# Patient Record
Sex: FEMALE | Race: WHITE | Employment: FULL TIME | URBAN - METROPOLITAN AREA
[De-identification: names, ages, dates, MRNs, and addresses within clinical notes are randomized per-mention and may not be internally consistent; named-entity substitution may affect disease eponyms.]

---

## 2019-11-04 ENCOUNTER — OFFICE VISIT (OUTPATIENT)
Dept: FAMILY MEDICINE CLINIC | Age: 24
End: 2019-11-04
Payer: COMMERCIAL

## 2019-11-04 VITALS
BODY MASS INDEX: 43.16 KG/M2 | SYSTOLIC BLOOD PRESSURE: 110 MMHG | OXYGEN SATURATION: 98 % | DIASTOLIC BLOOD PRESSURE: 64 MMHG | HEART RATE: 92 BPM | WEIGHT: 275 LBS | HEIGHT: 67 IN | TEMPERATURE: 98 F

## 2019-11-04 DIAGNOSIS — G47.30 SLEEP APNEA, UNSPECIFIED TYPE: Primary | ICD-10-CM

## 2019-11-04 DIAGNOSIS — G43.109 MIGRAINE WITH AURA AND WITHOUT STATUS MIGRAINOSUS, NOT INTRACTABLE: ICD-10-CM

## 2019-11-04 PROCEDURE — 99203 OFFICE O/P NEW LOW 30 MIN: CPT | Performed by: NURSE PRACTITIONER

## 2019-11-04 RX ORDER — SUMATRIPTAN 50 MG/1
50 TABLET, FILM COATED ORAL
Qty: 9 TABLET | Refills: 3 | Status: SHIPPED | OUTPATIENT
Start: 2019-11-04 | End: 2020-10-06

## 2019-11-04 ASSESSMENT — ENCOUNTER SYMPTOMS
SHORTNESS OF BREATH: 0
WHEEZING: 0

## 2019-11-04 ASSESSMENT — PATIENT HEALTH QUESTIONNAIRE - PHQ9
SUM OF ALL RESPONSES TO PHQ QUESTIONS 1-9: 0
SUM OF ALL RESPONSES TO PHQ9 QUESTIONS 1 & 2: 0
1. LITTLE INTEREST OR PLEASURE IN DOING THINGS: 0
2. FEELING DOWN, DEPRESSED OR HOPELESS: 0
SUM OF ALL RESPONSES TO PHQ QUESTIONS 1-9: 0

## 2019-12-12 ENCOUNTER — OFFICE VISIT (OUTPATIENT)
Dept: PULMONOLOGY | Age: 24
End: 2019-12-12
Payer: COMMERCIAL

## 2019-12-12 ENCOUNTER — TELEPHONE (OUTPATIENT)
Dept: PULMONOLOGY | Age: 24
End: 2019-12-12

## 2019-12-12 VITALS
HEART RATE: 85 BPM | HEIGHT: 67 IN | RESPIRATION RATE: 16 BRPM | BODY MASS INDEX: 42.53 KG/M2 | TEMPERATURE: 99 F | SYSTOLIC BLOOD PRESSURE: 118 MMHG | DIASTOLIC BLOOD PRESSURE: 85 MMHG | WEIGHT: 271 LBS | OXYGEN SATURATION: 98 %

## 2019-12-12 DIAGNOSIS — E66.01 OBESITY, MORBID, BMI 40.0-49.9 (HCC): ICD-10-CM

## 2019-12-12 DIAGNOSIS — G47.10 HYPERSOMNIA: ICD-10-CM

## 2019-12-12 DIAGNOSIS — G47.30 OBSERVED SLEEP APNEA: ICD-10-CM

## 2019-12-12 DIAGNOSIS — G47.33 MILD OBSTRUCTIVE SLEEP APNEA: Primary | ICD-10-CM

## 2019-12-12 DIAGNOSIS — Z71.89 CPAP USE COUNSELING: ICD-10-CM

## 2019-12-12 DIAGNOSIS — R06.83 SNORING: ICD-10-CM

## 2019-12-12 PROCEDURE — 99203 OFFICE O/P NEW LOW 30 MIN: CPT | Performed by: NURSE PRACTITIONER

## 2019-12-12 ASSESSMENT — SLEEP AND FATIGUE QUESTIONNAIRES
HOW LIKELY ARE YOU TO NOD OFF OR FALL ASLEEP WHILE SITTING AND READING: 2
HOW LIKELY ARE YOU TO NOD OFF OR FALL ASLEEP WHILE SITTING INACTIVE IN A PUBLIC PLACE: 0
HOW LIKELY ARE YOU TO NOD OFF OR FALL ASLEEP WHILE LYING DOWN TO REST IN THE AFTERNOON WHEN CIRCUMSTANCES PERMIT: 3
NECK CIRCUMFERENCE (INCHES): 17.25
HOW LIKELY ARE YOU TO NOD OFF OR FALL ASLEEP IN A CAR, WHILE STOPPED FOR A FEW MINUTES IN TRAFFIC: 0
HOW LIKELY ARE YOU TO NOD OFF OR FALL ASLEEP WHILE SITTING AND TALKING TO SOMEONE: 0
HOW LIKELY ARE YOU TO NOD OFF OR FALL ASLEEP WHILE SITTING QUIETLY AFTER LUNCH WITHOUT ALCOHOL: 1
ESS TOTAL SCORE: 11
HOW LIKELY ARE YOU TO NOD OFF OR FALL ASLEEP WHEN YOU ARE A PASSENGER IN A CAR FOR AN HOUR WITHOUT A BREAK: 3
HOW LIKELY ARE YOU TO NOD OFF OR FALL ASLEEP WHILE WATCHING TV: 2

## 2019-12-16 ENCOUNTER — OFFICE VISIT (OUTPATIENT)
Dept: FAMILY MEDICINE CLINIC | Age: 24
End: 2019-12-16
Payer: COMMERCIAL

## 2019-12-16 ENCOUNTER — HOSPITAL ENCOUNTER (OUTPATIENT)
Dept: GENERAL RADIOLOGY | Age: 24
Discharge: HOME | End: 2019-12-16
Payer: COMMERCIAL

## 2019-12-16 VITALS
DIASTOLIC BLOOD PRESSURE: 74 MMHG | TEMPERATURE: 98.3 F | OXYGEN SATURATION: 97 % | WEIGHT: 273 LBS | SYSTOLIC BLOOD PRESSURE: 100 MMHG | BODY MASS INDEX: 42.85 KG/M2 | HEIGHT: 67 IN | HEART RATE: 87 BPM

## 2019-12-16 DIAGNOSIS — M25.571 ACUTE RIGHT ANKLE PAIN: ICD-10-CM

## 2019-12-16 DIAGNOSIS — R53.83 FATIGUE, UNSPECIFIED TYPE: ICD-10-CM

## 2019-12-16 DIAGNOSIS — Z00.00 PHYSICAL EXAM: Primary | ICD-10-CM

## 2019-12-16 DIAGNOSIS — M79.671 RIGHT FOOT PAIN: ICD-10-CM

## 2019-12-16 DIAGNOSIS — M79.671 RIGHT FOOT PAIN: Primary | ICD-10-CM

## 2019-12-16 LAB
A/G RATIO: 1.3 (ref 1.1–2.2)
ALBUMIN SERPL-MCNC: 3.9 G/DL (ref 3.4–5)
ALP BLD-CCNC: 108 U/L (ref 40–129)
ALT SERPL-CCNC: 14 U/L (ref 10–40)
ANION GAP SERPL CALCULATED.3IONS-SCNC: 14 MMOL/L (ref 3–16)
AST SERPL-CCNC: 11 U/L (ref 15–37)
BASOPHILS ABSOLUTE: 0 K/UL (ref 0–0.2)
BASOPHILS RELATIVE PERCENT: 0.4 %
BILIRUB SERPL-MCNC: 0.3 MG/DL (ref 0–1)
BUN BLDV-MCNC: 21 MG/DL (ref 7–20)
CALCIUM SERPL-MCNC: 9.6 MG/DL (ref 8.3–10.6)
CHLORIDE BLD-SCNC: 103 MMOL/L (ref 99–110)
CHOLESTEROL, TOTAL: 161 MG/DL (ref 0–199)
CO2: 23 MMOL/L (ref 21–32)
CREAT SERPL-MCNC: 0.6 MG/DL (ref 0.6–1.1)
EOSINOPHILS ABSOLUTE: 0.2 K/UL (ref 0–0.6)
EOSINOPHILS RELATIVE PERCENT: 1.7 %
GFR AFRICAN AMERICAN: >60
GFR NON-AFRICAN AMERICAN: >60
GLOBULIN: 3.1 G/DL
GLUCOSE BLD-MCNC: 101 MG/DL (ref 70–99)
HCT VFR BLD CALC: 43.7 % (ref 36–48)
HDLC SERPL-MCNC: 34 MG/DL (ref 40–60)
HEMOGLOBIN: 13.9 G/DL (ref 12–16)
LDL CHOLESTEROL CALCULATED: 99 MG/DL
LYMPHOCYTES ABSOLUTE: 3 K/UL (ref 1–5.1)
LYMPHOCYTES RELATIVE PERCENT: 23.4 %
MCH RBC QN AUTO: 27.8 PG (ref 26–34)
MCHC RBC AUTO-ENTMCNC: 31.8 G/DL (ref 31–36)
MCV RBC AUTO: 87.5 FL (ref 80–100)
MONOCYTES ABSOLUTE: 0.5 K/UL (ref 0–1.3)
MONOCYTES RELATIVE PERCENT: 3.8 %
NEUTROPHILS ABSOLUTE: 9 K/UL (ref 1.7–7.7)
NEUTROPHILS RELATIVE PERCENT: 70.7 %
PDW BLD-RTO: 15.8 % (ref 12.4–15.4)
PLATELET # BLD: 356 K/UL (ref 135–450)
PMV BLD AUTO: 8.9 FL (ref 5–10.5)
POTASSIUM SERPL-SCNC: 4.8 MMOL/L (ref 3.5–5.1)
RBC # BLD: 5 M/UL (ref 4–5.2)
SODIUM BLD-SCNC: 140 MMOL/L (ref 136–145)
TOTAL PROTEIN: 7 G/DL (ref 6.4–8.2)
TRIGL SERPL-MCNC: 140 MG/DL (ref 0–150)
TSH REFLEX FT4: 3.25 UIU/ML (ref 0.27–4.2)
VLDLC SERPL CALC-MCNC: 28 MG/DL
WBC # BLD: 12.7 K/UL (ref 4–11)

## 2019-12-16 PROCEDURE — 99395 PREV VISIT EST AGE 18-39: CPT | Performed by: NURSE PRACTITIONER

## 2019-12-16 PROCEDURE — 36415 COLL VENOUS BLD VENIPUNCTURE: CPT | Performed by: NURSE PRACTITIONER

## 2019-12-16 PROCEDURE — 73610 X-RAY EXAM OF ANKLE: CPT

## 2019-12-16 PROCEDURE — 73630 X-RAY EXAM OF FOOT: CPT

## 2019-12-16 ASSESSMENT — ENCOUNTER SYMPTOMS
DIARRHEA: 0
ABDOMINAL PAIN: 0
WHEEZING: 0
SHORTNESS OF BREATH: 0
SORE THROAT: 0
COUGH: 0
NAUSEA: 0

## 2019-12-17 LAB
ESTIMATED AVERAGE GLUCOSE: 125.5 MG/DL
HBA1C MFR BLD: 6 %

## 2019-12-17 NOTE — TELEPHONE ENCOUNTER
HST from 11/2/2017 reviewed and showed RDI 16.7, low SPO2 85%. Okay to trial auto CPAP.   Orders reviewed and signed

## 2020-03-17 ENCOUNTER — NURSE TRIAGE (OUTPATIENT)
Dept: OTHER | Facility: CLINIC | Age: 25
End: 2020-03-17

## 2020-03-17 NOTE — TELEPHONE ENCOUNTER
Reason for Disposition   Wheezing is present    Protocols used: COUGH-ADULT-OH    Patient called [facility]) to schedule appointment, with red flag complaint, transferred to RN access for triage. Reports having cough, runny nose, shortness of breath with exertion. States symptoms began two Fridays ago. Reports cough is a dry cough. Denies any fever. Patient informed of disposition. Care advice as documented. Instructed patient to call back with worsening symptoms. Soft transfer to pre-service center to schedule appointment as recommended. Please do not respond to the triage nurse through this encounter. Any subsequent communication should be directly with the patient.

## 2020-03-17 NOTE — PATIENT INSTRUCTIONS
Steps to help prevent the spread of COVID-19 if you are sick  SOURCE - [email or web address]-mcginnis.info/. html     Stay home except to get medical care   ; Stay home: People who are mildly ill with COVID-19 are able to isolate at home during their illness. You should restrict activities outside your home, except for getting medical care.   ; Avoid public areas: Do not go to work, school, or public areas.   ; Avoid public transportation: Avoid using public transportation, ride-sharing, or taxis.  ; Separate yourself from other people and animals in your home   ; Stay away from others: As much as possible, you should stay in a specific room and away from other people in your home. Also, you should use a separate bathroom, if available.   ; Limit contact with pets & animals: You should restrict contact with pets and other animals while you are sick with COVID-19, just like you would around other people. Although there have not been reports of pets or other animals becoming sick with COVID-19, it is still recommended that people sick with COVID-19 limit contact with animals until more information is known about the virus. ; When possible, have another member of your household care for your animals while you are sick. If you are sick with COVID-19, avoid contact with your pet, including petting, snuggling, being kissed or licked, and sharing food. If you must care for your pet or be around animals while you are sick, wash your hands before and after you interact with pets and wear a facemask. See COVID-19 and Animals for more information. Other considerations   The ill person should eat/be fed in their room if possible. Non-disposable  items used should be handled with gloves and washed with hot water or in a . Clean hands after handling used  items.  If possible, dedicate a lined trash can for the ill person.  Use gloves when removing garbage bags, handling, and disposing of trash. Wash hands after handling or disposing of trash.  Consider consulting with your local health department about trash disposal guidance if available. Information for Household Members and Caregivers of Someone who is Sick   Call ahead before visiting your doctor   Call ahead: If you have a medical appointment, call the healthcare provider and tell them that you have or may have COVID-19. This will help the healthcare provider's office take steps to keep other people from getting infected or exposed. Wear a facemask if you are sick   ; If you are sick: You should wear a facemask when you are around other people (e.g., sharing a room or vehicle) or pets and before you enter a healthcare provider's office. ; If you are caring for others: If the person who is sick is not able to wear a facemask (for example, because it causes trouble breathing), then people who live with the person who is sick should not stay in the same room with them, or they should wear a facemask if they enter a room with the person who is sick. Cover your coughs and sneezes   ; Cover: Cover your mouth and nose with a tissue when you cough or sneeze.   ; Dispose: Throw used tissues in a lined trash can.   ; Wash hands: Immediately wash your hands with soap and water for at least 20 seconds or, if soap and water are not available, clean your hands with an alcohol-based hand  that contains at least 60% alcohol. Clean your hands often   ;  Wash hands: Wash your hands often with soap and water for at least 20 seconds, especially after blowing your nose, coughing, or sneezing; going to the bathroom; and before eating or preparing food.   ; Hand : If soap and water are not readily available, use an alcohol-based hand  with at least 60% alcohol, covering all surfaces of your hands and rubbing them together until they feel dry.   ; Soap and water: Soap and water are the best option if

## 2020-03-18 ENCOUNTER — OFFICE VISIT (OUTPATIENT)
Dept: PRIMARY CARE CLINIC | Age: 25
End: 2020-03-18
Payer: COMMERCIAL

## 2020-03-18 VITALS — OXYGEN SATURATION: 98 % | TEMPERATURE: 98.4 F | HEART RATE: 85 BPM

## 2020-03-18 PROCEDURE — 99212 OFFICE O/P EST SF 10 MIN: CPT | Performed by: NURSE PRACTITIONER

## 2020-03-25 LAB
REPORT: NORMAL
SARS-COV-2: NOT DETECTED
THIS TEST SENT TO: NORMAL

## 2020-04-27 ENCOUNTER — E-VISIT (OUTPATIENT)
Dept: PRIMARY CARE CLINIC | Age: 25
End: 2020-04-27
Payer: COMMERCIAL

## 2020-04-27 PROCEDURE — 99422 OL DIG E/M SVC 11-20 MIN: CPT | Performed by: FAMILY MEDICINE

## 2020-04-27 RX ORDER — OFLOXACIN 3 MG/ML
1-2 SOLUTION/ DROPS OPHTHALMIC 4 TIMES DAILY
Qty: 5 ML | Refills: 0 | Status: SHIPPED | OUTPATIENT
Start: 2020-04-27 | End: 2020-05-02

## 2020-09-08 ENCOUNTER — E-VISIT (OUTPATIENT)
Dept: FAMILY MEDICINE CLINIC | Age: 25
End: 2020-09-08
Payer: COMMERCIAL

## 2020-09-08 PROCEDURE — 98970 NQHP OL DIG ASSMT&MGMT 5-10: CPT | Performed by: NURSE PRACTITIONER

## 2020-09-08 RX ORDER — SELENIUM SULFIDE 22.5 MG/ML
SHAMPOO TOPICAL
Qty: 180 ML | Refills: 2 | Status: SHIPPED | OUTPATIENT
Start: 2020-09-08 | End: 2020-10-06

## 2020-09-08 NOTE — PROGRESS NOTES
Looks like it could be tinea versicolor which is a fungal rash- sent in an antifungal shampoo to use.   ~8 min spent on visit

## 2020-10-06 ENCOUNTER — OFFICE VISIT (OUTPATIENT)
Dept: FAMILY MEDICINE CLINIC | Age: 25
End: 2020-10-06
Payer: COMMERCIAL

## 2020-10-06 VITALS
HEART RATE: 89 BPM | WEIGHT: 274.4 LBS | DIASTOLIC BLOOD PRESSURE: 80 MMHG | SYSTOLIC BLOOD PRESSURE: 114 MMHG | OXYGEN SATURATION: 97 % | HEIGHT: 68 IN | BODY MASS INDEX: 41.59 KG/M2 | TEMPERATURE: 97.3 F

## 2020-10-06 LAB
A/G RATIO: 1.6 (ref 1.1–2.2)
ALBUMIN SERPL-MCNC: 4.4 G/DL (ref 3.4–5)
ALP BLD-CCNC: 98 U/L (ref 40–129)
ALT SERPL-CCNC: 14 U/L (ref 10–40)
ANION GAP SERPL CALCULATED.3IONS-SCNC: 15 MMOL/L (ref 3–16)
AST SERPL-CCNC: 15 U/L (ref 15–37)
BASOPHILS ABSOLUTE: 0.1 K/UL (ref 0–0.2)
BASOPHILS RELATIVE PERCENT: 0.6 %
BILIRUB SERPL-MCNC: 0.5 MG/DL (ref 0–1)
BUN BLDV-MCNC: 15 MG/DL (ref 7–20)
CALCIUM SERPL-MCNC: 9.6 MG/DL (ref 8.3–10.6)
CHLORIDE BLD-SCNC: 99 MMOL/L (ref 99–110)
CHOLESTEROL, TOTAL: 173 MG/DL (ref 0–199)
CO2: 20 MMOL/L (ref 21–32)
CREAT SERPL-MCNC: 0.7 MG/DL (ref 0.6–1.1)
EOSINOPHILS ABSOLUTE: 0.3 K/UL (ref 0–0.6)
EOSINOPHILS RELATIVE PERCENT: 2.9 %
GFR AFRICAN AMERICAN: >60
GFR NON-AFRICAN AMERICAN: >60
GLOBULIN: 2.7 G/DL
GLUCOSE BLD-MCNC: 96 MG/DL (ref 70–99)
HCT VFR BLD CALC: 44.8 % (ref 36–48)
HDLC SERPL-MCNC: 30 MG/DL (ref 40–60)
HEMOGLOBIN: 14.5 G/DL (ref 12–16)
LDL CHOLESTEROL CALCULATED: 97 MG/DL
LYMPHOCYTES ABSOLUTE: 2.5 K/UL (ref 1–5.1)
LYMPHOCYTES RELATIVE PERCENT: 23.5 %
MCH RBC QN AUTO: 28 PG (ref 26–34)
MCHC RBC AUTO-ENTMCNC: 32.5 G/DL (ref 31–36)
MCV RBC AUTO: 86.3 FL (ref 80–100)
MONOCYTES ABSOLUTE: 0.4 K/UL (ref 0–1.3)
MONOCYTES RELATIVE PERCENT: 3.9 %
NEUTROPHILS ABSOLUTE: 7.2 K/UL (ref 1.7–7.7)
NEUTROPHILS RELATIVE PERCENT: 69.1 %
PDW BLD-RTO: 15.3 % (ref 12.4–15.4)
PLATELET # BLD: 354 K/UL (ref 135–450)
PMV BLD AUTO: 8.6 FL (ref 5–10.5)
POTASSIUM SERPL-SCNC: 4.4 MMOL/L (ref 3.5–5.1)
RBC # BLD: 5.19 M/UL (ref 4–5.2)
SODIUM BLD-SCNC: 134 MMOL/L (ref 136–145)
TOTAL PROTEIN: 7.1 G/DL (ref 6.4–8.2)
TRIGL SERPL-MCNC: 230 MG/DL (ref 0–150)
VLDLC SERPL CALC-MCNC: 46 MG/DL
WBC # BLD: 10.4 K/UL (ref 4–11)

## 2020-10-06 PROCEDURE — 90471 IMMUNIZATION ADMIN: CPT | Performed by: NURSE PRACTITIONER

## 2020-10-06 PROCEDURE — 90686 IIV4 VACC NO PRSV 0.5 ML IM: CPT | Performed by: NURSE PRACTITIONER

## 2020-10-06 PROCEDURE — 36415 COLL VENOUS BLD VENIPUNCTURE: CPT | Performed by: NURSE PRACTITIONER

## 2020-10-06 PROCEDURE — G8482 FLU IMMUNIZE ORDER/ADMIN: HCPCS | Performed by: NURSE PRACTITIONER

## 2020-10-06 PROCEDURE — 99395 PREV VISIT EST AGE 18-39: CPT | Performed by: NURSE PRACTITIONER

## 2020-10-06 ASSESSMENT — ENCOUNTER SYMPTOMS
DIARRHEA: 0
SHORTNESS OF BREATH: 0
COUGH: 0
ABDOMINAL PAIN: 0
SORE THROAT: 0
EYE PAIN: 0
WHEEZING: 0
CONSTIPATION: 0

## 2020-10-06 ASSESSMENT — PATIENT HEALTH QUESTIONNAIRE - PHQ9
SUM OF ALL RESPONSES TO PHQ9 QUESTIONS 1 & 2: 0
SUM OF ALL RESPONSES TO PHQ QUESTIONS 1-9: 0
1. LITTLE INTEREST OR PLEASURE IN DOING THINGS: 0
SUM OF ALL RESPONSES TO PHQ QUESTIONS 1-9: 0
2. FEELING DOWN, DEPRESSED OR HOPELESS: 0

## 2020-10-06 NOTE — PROGRESS NOTES
Patient ID: Fozia Martínez is a 30 y.o. female who presents today for a Physical Exam.      HPI   Here for physical exam    Chronic bilateral knee pain: has been hurting for a long time- states she saw someone at [facility] clinic in the past and was told it is runners knee. She states it hurts when she walks or goes up and down steps. Has done PT in the past and it has not helped.  She takes ibuprofen prn- doesn't help much    Needs a GYN- has nexplanon and wants it removed- will give GYN name and info    Past Medical History:   Diagnosis Date    Mild pre-eclampsia, postpartum 2018    had to go back to the hospital- with second pregancy     Sleep apnea     Varicella        Past Surgical History:   Procedure Laterality Date    BLADDER SURGERY      when she was around 3 yo       Family History   Problem Relation Age of Onset    High Blood Pressure Mother     High Cholesterol Mother     Asthma Mother     High Cholesterol Father     High Blood Pressure Maternal Grandmother     Other Maternal Grandmother         prediabetes    No Known Problems Paternal Grandmother     No Known Problems Paternal Grandfather           Social History     Socioeconomic History    Marital status: Single     Spouse name: None    Number of children: None    Years of education: None    Highest education level: None   Occupational History    None   Social Needs    Financial resource strain: None    Food insecurity     Worry: None     Inability: None    Transportation needs     Medical: None     Non-medical: None   Tobacco Use    Smoking status: Never Smoker    Smokeless tobacco: Never Used   Substance and Sexual Activity    Alcohol use: Yes     Comment: social    Drug use: Never    Sexual activity: Yes     Partners: Male     Birth control/protection: Implant     Comment: nexplanon   Lifestyle    Physical activity     Days per week: None     Minutes per session: None    Stress: None   Relationships    Social connections Talks on phone: None     Gets together: None     Attends Alevism service: None     Active member of club or organization: None     Attends meetings of clubs or organizations: None     Relationship status: None    Intimate partner violence     Fear of current or ex partner: None     Emotionally abused: None     Physically abused: None     Forced sexual activity: None   Other Topics Concern    None   Social History Narrative    None       Allergies   Allergen Reactions    Sulfa Antibiotics Other (See Comments)     Unsure of reaction    Pcn [Penicillins] Rash       Current Outpatient Medications   Medication Sig Dispense Refill    Etonogestrel (NEXPLANON SC) Inject 1 Device into the skin Indications: placed 2018       No current facility-administered medications for this visit. The patient's past medical history, past surgical history, family history, medications, and allergies were all reviewed and updated at appropriate today. Review of Systems   Constitutional: Negative for fever. HENT: Negative for congestion and sore throat. Eyes: Negative for pain and visual disturbance. Wears glasses and contacts   Respiratory: Negative for cough, shortness of breath and wheezing. Cardiovascular: Negative for chest pain and palpitations. Gastrointestinal: Negative for abdominal pain, constipation and diarrhea. Endocrine: Negative for cold intolerance and heat intolerance. Genitourinary: Negative for difficulty urinating, frequency and urgency. Musculoskeletal:        Bilateral Chronic knee pain - [facility] called it \"runners knee\"- ibuprofen as needed   Skin: Negative for rash and wound. Allergic/Immunologic: Negative for food allergies. Environmental allergies: seasonal.   Neurological: Negative for numbness and headaches. Hematological: Does not bruise/bleed easily. Psychiatric/Behavioral: Negative for dysphoric mood and sleep disturbance. The patient is not nervous/anxious. Physical Exam  Vitals signs and nursing note reviewed. Constitutional:       Appearance: Normal appearance. She is well-developed. She is obese. HENT:      Head: Normocephalic and atraumatic. Right Ear: Tympanic membrane and external ear normal.      Left Ear: Tympanic membrane and external ear normal.      Nose: Nose normal.      Mouth/Throat:      Pharynx: No oropharyngeal exudate or posterior oropharyngeal erythema. Eyes:      Conjunctiva/sclera: Conjunctivae normal.   Neck:      Musculoskeletal: Normal range of motion and neck supple. Cardiovascular:      Rate and Rhythm: Normal rate and regular rhythm. Heart sounds: Normal heart sounds. No murmur. Pulmonary:      Effort: Pulmonary effort is normal. No respiratory distress. Breath sounds: Normal breath sounds. No wheezing or rales. Abdominal:      General: Bowel sounds are normal. There is no distension. Palpations: Abdomen is soft. Tenderness: There is no abdominal tenderness. There is no rebound. Musculoskeletal: Normal range of motion. General: No swelling. Lymphadenopathy:      Cervical: No cervical adenopathy. Skin:     General: Skin is warm and dry. Neurological:      General: No focal deficit present. Mental Status: She is alert and oriented to person, place, and time. Deep Tendon Reflexes: Reflexes are normal and symmetric. Psychiatric:         Mood and Affect: Mood normal.         Behavior: Behavior normal.         Thought Content: Thought content normal.         Judgment: Judgment normal.         Assessment:  Encounter Diagnoses   Name Primary?  Physical exam Yes    Chronic pain of both knees     Need for influenza vaccination        Plan:  1. Physical exam    - CBC Auto Differential  - Comprehensive Metabolic Panel  - Hemoglobin A1C  - Lipid Panel    2. Chronic pain of both knees    - Rosalba - Silver Hoskins DO, Orthopedic Surgery, [facility]    3.  Need for influenza

## 2020-10-06 NOTE — PATIENT INSTRUCTIONS
[facility] - Anh Clancy, DO  [address] [address]   CDNVN:[phone]

## 2020-10-07 LAB
ESTIMATED AVERAGE GLUCOSE: 128.4 MG/DL
HBA1C MFR BLD: 6.1 %

## 2020-10-22 ENCOUNTER — OFFICE VISIT (OUTPATIENT)
Dept: ORTHOPEDIC SURGERY | Age: 25
End: 2020-10-22
Payer: COMMERCIAL

## 2020-10-22 VITALS — HEIGHT: 68 IN | BODY MASS INDEX: 41.52 KG/M2 | WEIGHT: 274 LBS

## 2020-10-22 PROCEDURE — 99203 OFFICE O/P NEW LOW 30 MIN: CPT | Performed by: ORTHOPAEDIC SURGERY

## 2020-10-22 PROCEDURE — G8417 CALC BMI ABV UP PARAM F/U: HCPCS | Performed by: ORTHOPAEDIC SURGERY

## 2020-10-22 PROCEDURE — G8427 DOCREV CUR MEDS BY ELIG CLIN: HCPCS | Performed by: ORTHOPAEDIC SURGERY

## 2020-10-22 PROCEDURE — G8482 FLU IMMUNIZE ORDER/ADMIN: HCPCS | Performed by: ORTHOPAEDIC SURGERY

## 2020-10-22 RX ORDER — DICLOFENAC SODIUM 75 MG/1
75 TABLET, DELAYED RELEASE ORAL 2 TIMES DAILY
Qty: 60 TABLET | Refills: 2 | Status: SHIPPED | OUTPATIENT
Start: 2020-10-22 | End: 2021-10-25

## 2020-10-22 NOTE — PROGRESS NOTES
Male     Birth control/protection: Implant     Comment: nexplanon   Lifestyle    Physical activity     Days per week: Not on file     Minutes per session: Not on file    Stress: Not on file   Relationships    Social connections     Talks on phone: Not on file     Gets together: Not on file     Attends Sabianism service: Not on file     Active member of club or organization: Not on file     Attends meetings of clubs or organizations: Not on file     Relationship status: Not on file    Intimate partner violence     Fear of current or ex partner: Not on file     Emotionally abused: Not on file     Physically abused: Not on file     Forced sexual activity: Not on file   Other Topics Concern    Not on file   Social History Narrative    Not on file     Allergies   Allergen Reactions    Sulfa Antibiotics Other (See Comments)     Unsure of reaction    Pcn [Penicillins] Rash       Review of Systems:  Constitutional: negative  Respiratory: negative  Cardiovascular: negative  Musculoskeletal:negative except for New Patient (Bilateral knee arpita for a few years)    Relevant review of systems reviewed and available in the patient's chart in media tab    Vital Signs:  Vitals:    10/22/20 0843   Weight: 274 lb (124.3 kg)   Height: 5' 7.72\" (1.72 m)         General Exam:   Constitutional: Patient is adequately groomed with no evidence of malnutrition  Mental Status: The patient is oriented to time, place and person. The patient's mood and affect are appropriate. Vascular: Examination reveals no swelling or calf tenderness. Peripheral pulses are palpable and 2+.    bilateral Knee Examination  Inspection:   No gross deformities noted. no swelling noted. No erythema or ecchymosis. Skin is intact.     Palpation: negative Tenderness to palpation along the medial joint line, negative Tenderness to palpation along lateral joint line, positive Tenderness to palpation along medial and lateral facets of undersurface of the treatment, including physical therapy, and oral medications. Today we prescribed diclofenac and HEP. will continue weight loss with diet and exercise. They understand that PFS can require long-term physical therapy to alleviate symptoms. She will follow up in 2-3 months for reevaluation. Patient agrees with this plan, all of their questions were answered best of our ability and to their satisfaction.       Roxana Andrade

## 2020-11-04 ENCOUNTER — OFFICE VISIT (OUTPATIENT)
Dept: OBGYN CLINIC | Age: 25
End: 2020-11-04
Payer: COMMERCIAL

## 2020-11-04 VITALS
HEART RATE: 88 BPM | BODY MASS INDEX: 42.9 KG/M2 | SYSTOLIC BLOOD PRESSURE: 128 MMHG | WEIGHT: 279.8 LBS | DIASTOLIC BLOOD PRESSURE: 84 MMHG | TEMPERATURE: 97.6 F

## 2020-11-04 PROCEDURE — 99385 PREV VISIT NEW AGE 18-39: CPT | Performed by: OBSTETRICS & GYNECOLOGY

## 2020-11-04 PROCEDURE — G8482 FLU IMMUNIZE ORDER/ADMIN: HCPCS | Performed by: OBSTETRICS & GYNECOLOGY

## 2020-11-04 NOTE — PROGRESS NOTES
Annual Exam      CC:   Chief Complaint   Patient presents with    Gynecologic Exam     annual       HPI:  30 y.o. [id number] presents for her gynecologic annual exam.     Also wants to discuss nexplanon removal, has been feeling really emotional and has had worsening acne. Has been on nuvaring, Mirena, OCP and all have had really bad side effects. States she would like to just try to be off of everything as her partner will be getting a vasectomy soon. Patient seen and examined. Review of Systems:   Review of Systems   Constitutional: Negative for chills and fever. Respiratory: Negative for shortness of breath. Cardiovascular: Negative for chest pain. Gastrointestinal: Negative for abdominal pain, constipation, diarrhea, nausea and vomiting. Genitourinary: Negative for difficulty urinating, dysuria and menstrual problem. Neurological: Negative for dizziness, light-headedness and headaches. Psychiatric/Behavioral: Positive for dysphoric mood.        Primary Care Physician: JAKOB Falk - CNP    Obstetric History  OB History    Para Term  AB Living   3 2 2   1 2   SAB TAB Ectopic Molar Multiple Live Births             2      # Outcome Date GA Lbr [place]/2nd Weight Sex Delivery Anes PTL Lv   3 Term 18    F Vag-Spont EPI  [place]   2 Term 10/19/16    F Vag-Spont EPI  [place]   1 AB 09/04/15               Gynecologic History  Menstrual History:   LMP: irregular, has nexplanon   Menstrual pattern: n/a, amenorrhea secondary to nexplanon  Sexual History:   Contraception: nexplanon   Currently is sexually active   Denies history of STIs   No sexual problems  Pap History:   Last pap smear: 2016, normal   History of abnormal pap smears: denies    Medical History:  Past Medical History:   Diagnosis Date    Herpes simplex virus (HSV) infection     cold sores    Mild pre-eclampsia, postpartum 2018    had to go back to the hospital- with second pregancy     Postpartum depression     Psychiatric problem     Sleep apnea     Stress incontinence     Varicella        Surgical History:  Past Surgical History:   Procedure Laterality Date    BLADDER SURGERY      when she was around 5 yo       Medications:  Current Outpatient Medications   Medication Sig Dispense Refill    diclofenac (VOLTAREN) 75 MG EC tablet Take 1 tablet by mouth 2 times daily 60 tablet 2    Etonogestrel (NEXPLANON SC) Inject 1 Device into the skin Indications: placed 2018       No current facility-administered medications for this visit. Allergies:   Allergies   Allergen Reactions    Sulfa Antibiotics Other (See Comments)     Unsure of reaction    Pcn [Penicillins] Rash       Social History:  Social History     Socioeconomic History    Marital status: Single     Spouse name: None    Number of children: None    Years of education: None    Highest education level: None   Occupational History    None   Social Needs    Financial resource strain: None    Food insecurity     Worry: None     Inability: None    Transportation needs     Medical: None     Non-medical: None   Tobacco Use    Smoking status: Never Smoker    Smokeless tobacco: Never Used   Substance and Sexual Activity    Alcohol use: Yes     Comment: social    Drug use: Never    Sexual activity: Yes     Partners: Male     Birth control/protection: Implant     Comment: nexplanon   Lifestyle    Physical activity     Days per week: None     Minutes per session: None    Stress: None   Relationships    Social connections     Talks on phone: None     Gets together: None     Attends Holiness service: None     Active member of club or organization: None     Attends meetings of clubs or organizations: None     Relationship status: None    Intimate partner violence     Fear of current or ex partner: None     Emotionally abused: None     Physically abused: None     Forced sexual activity: None   Other Topics Concern    None   Social History Narrative    None       Family History:  Family History   Problem Relation Age of Onset    High Blood Pressure Mother     High Cholesterol Mother     Asthma Mother     High Cholesterol Father     High Blood Pressure Maternal Grandmother     Other Maternal Grandmother         prediabetes    No Known Problems Paternal Grandmother     No Known Problems Paternal Grandfather        Maternal aunt with breast cancer, otherwise denies personal/family history of cervical, uterine, ovarian, vulvar, breast, or colon cancers. Objective: Body mass index is 42.9 kg/m². /84 (Site: Left Upper Arm, Position: Sitting, Cuff Size: Large Adult)   Pulse 88   Temp 97.6 °F (36.4 °C) (Infrared)   Wt 279 lb 12.8 oz (126.9 kg)   BMI 42.90 kg/m²     Exam:   Physical Exam  Exam conducted with a chaperone present. Constitutional:       Appearance: She is well-developed. HENT:      Head: Normocephalic and atraumatic. Neck:      Musculoskeletal: Normal range of motion. Cardiovascular:      Rate and Rhythm: Normal rate and regular rhythm. Pulmonary:      Effort: Pulmonary effort is normal. No respiratory distress. Chest:      Breasts:         Right: Normal. No mass, nipple discharge or skin change. Left: Normal. No mass, nipple discharge or skin change. Abdominal:      General: There is no distension. Palpations: Abdomen is soft. Tenderness: There is no abdominal tenderness. There is no guarding or rebound. Genitourinary:     Comments: Pelvic exam: VULVA: normal appearing vulva with no masses, tenderness or lesions, VAGINA: normal appearing vagina with normal color and discharge, no lesions, CERVIX: normal appearing cervix without discharge or lesions, UTERUS: uterus is normal size, shape, consistency and nontender, ADNEXA: normal adnexa in size, nontender and no masses. Neurological:      Mental Status: She is alert and oriented to person, place, and time.          Assessment/Plan:  30 y.o. [id number] presenting for her annual exam:    1. Encounter for annual routine gynecological examination  Discussed age appropriate screening recommendations, pap smear sent today. Discussed breast self awareness, STI screening deferred. - PAP SMEAR    2. Pap smear for cervical cancer screening  - PAP SMEAR    3. Encounter for surveillance of implantable subdermal contraceptive  Currently has nexplanon, desires removal (placed Jan 2019). All questions answered regarding removal and alternate BCM options. Partner is planning for vasectomy.      Dispo: return for nexplanon removal  Kole Snow MD

## 2020-11-04 NOTE — PROGRESS NOTES
Last PAP was normal; May/2016.    Abnormal pap history? no  Last HPV screen: 2016 negative  Patient has never had a mammogram.  Last Dexa Scan: Asked/Not Answered   Contraceptive method: Implanon  No prior colonoscopy

## 2020-11-05 ASSESSMENT — ENCOUNTER SYMPTOMS
DIARRHEA: 0
ABDOMINAL PAIN: 0
CONSTIPATION: 0
SHORTNESS OF BREATH: 0
NAUSEA: 0
VOMITING: 0

## 2020-11-17 ENCOUNTER — OFFICE VISIT (OUTPATIENT)
Dept: OBGYN CLINIC | Age: 25
End: 2020-11-17
Payer: COMMERCIAL

## 2020-11-17 VITALS
SYSTOLIC BLOOD PRESSURE: 118 MMHG | DIASTOLIC BLOOD PRESSURE: 78 MMHG | TEMPERATURE: 98.1 F | WEIGHT: 279.5 LBS | BODY MASS INDEX: 42.85 KG/M2 | HEART RATE: 103 BPM

## 2020-11-17 PROCEDURE — 11982 REMOVE DRUG IMPLANT DEVICE: CPT | Performed by: OBSTETRICS & GYNECOLOGY

## 2020-11-17 NOTE — PROGRESS NOTES
Nexplanon Contraceptive Implant   Removal Note    The pt is a 30 y.o. [id number] who presents today for removal of a subdermal contraceptive implant. She has been counseled regarding the risks, benefits and alternatives of the procedure. She has signed the consent form, and wishes to proceed with removal today. Procedure Details  The inner side of the right arm was cleaned with Betadine and infiltrated with  1% lidocaine. A 5mm incision was made superior to the distal end of the nexplanon device and the device was brought to the level of the incision with a hemostat. Surrounding tissue capsule was taken down and nexplanon was removed without difficulty. The removal site was closed with steri-strips and coban dressing. The patient tolerated the procedure without complications.     Kole Snow MD

## 2020-12-01 NOTE — PROGRESS NOTES
Obstetric/Gynecologic History  Number of pregnancies: 3   Births: 2  Age at First Birth: 23  Age at Menstruation:  13  Still Menstruating? Yes, LMP states just had Nexplanon removed, irregular cycles, has not had one in four or five years  Hysterectomy? No    Age at first mammogram: N/A  Frequency of mammograms: N/A  Bra/Cup size: 38 C    History of breastfeeding? Yes   History of OCP Use? Yes for 5 years and is not currently taking  History of hormone use? Never. Review of Systems   Constitutional: Negative for chills, fatigue, fever and unexpected weight change. Eyes: Negative for visual disturbance. Respiratory: Negative for apnea, cough, shortness of breath and wheezing. Cardiovascular: Negative for chest pain, palpitations and leg swelling. Gastrointestinal: Negative for abdominal distention, abdominal pain, nausea and vomiting. Musculoskeletal: Negative for arthralgias, back pain, gait problem and neck pain. Skin: Negative for rash and wound. Neurological: Negative for syncope, weakness, numbness and headaches. Hematological: Negative for adenopathy. Does not bruise/bleed easily. Psychiatric/Behavioral: Negative for confusion and dysphoric mood. The patient is not nervous/anxious.

## 2020-12-06 NOTE — PROGRESS NOTES
12/07/20     CHIEF COMPLAINT:  Skin tag on left breast    HISTORY OF PRESENT ILLNESS:  Bonnie Parrish is a 30 y.o. woman who is referred by Nathan Villegas MD who requested that I evaluate her for a left breast kkin tag. This has been present for about 3 years. She first noticed it after she breast fed her first child, and it has probably gotten a little bit bigger over the years. She desires removal of this because it is bothersome and gets caught on her bra/clothes. It is not painful or pruritic. No associated symptoms including nipple retraction, bleeding/ulceration or pain. No other skin lesions she is concerned of. She denies palpating any breast masses, or any change in the appearance of her breasts or the skin of her breasts. She denies bilateral nipple discharge. No prior breast biopsies. She has never had a mammogram or any breast imaging. Family history is significant for breast cancer in a maternal aunt (dx in her 45s, was told this was related to hormone therapy). No ovarian cancer in the family. She has no other systemic complaints and otherwise feels well today. She has no medical problems. PCP - JAKOB Redmond-CNP  GYN - Arlin Whaley MD    REVIEW OF SYSTEMS  Constitutional: Negative for chills, fatigue, fever and unexpected weight change. Eyes: Negative for visual disturbance. Respiratory: Negative for apnea, cough, shortness of breath and wheezing. Cardiovascular: Negative for chest pain, palpitations and leg swelling. Gastrointestinal: Negative for abdominal distention, abdominal pain, nausea and vomiting. Musculoskeletal: Negative for arthralgias, back pain, gait problem and neck pain. Skin: Negative for rash and wound. Neurological: Negative for syncope, weakness, numbness and headaches. Hematological: Negative for adenopathy. Does not bruise/bleed easily. Psychiatric/Behavioral: Negative for confusion and dysphoric mood.  The patient is not nervous/anxious. I personally reviewed and agree with the above ROS as documented by my nurse. Obstetric and Gynecologic History  Number of pregnancies: 3   Births: 2  Age at First Birth: 23  Age at Menstruation:  13  Still Menstruating? Yes, LMP states just had Nexplanon removed, irregular cycles, has not had one in about 6 months. Hysterectomy? No    Age at first mammogram: Never had one  Frequency of mammograms: N/A  Bra/Cup size: 38 C    History of breastfeeding? Yes   History of OCP Use? Yes for 5 years and is not currently taking  History of hormone use? Never. Past Medical History      Diagnosis Date    Herpes simplex virus (HSV) infection     cold sores    Mild pre-eclampsia, postpartum 2018    had to go back to the hospital- with second pregancy     Postpartum depression     Psychiatric problem     Sleep apnea     Stress incontinence     Varicella         Past Surgical History      Procedure Laterality Date    BLADDER SURGERY      when she was around 5 yo        Social History  Patient  reports that she has never smoked. She has never used smokeless tobacco. She reports current alcohol use. She reports that she does not use drugs. 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 Family History  Family History   Problem Relation Age of Onset    High Blood Pressure Mother     High Cholesterol Mother     Asthma Mother     High Cholesterol Father     High Blood Pressure Maternal Grandmother     Other Maternal Grandmother         prediabetes    No Known Problems Paternal Grandmother     No Known Problems Paternal Grandfather     Breast Cancer Maternal Aunt        Ashkenazi Bahai descent? No     Current Medications  Current Outpatient Medications   Medication Sig Dispense Refill    diclofenac (VOLTAREN) 75 MG EC tablet Take 1 tablet by mouth 2 times daily 60 tablet 2     No current facility-administered medications for this visit. Allergies  Allergies   Allergen Reactions    Sulfa Antibiotics Other (See Comments)     Unsure of reaction    Pcn [Penicillins] Rash       PHYSICAL EXAMINATION  VS: /87 (Site: Left Upper Arm, Position: Sitting, Cuff Size: Medium Adult)   Pulse 85   Temp 97.8 °F (36.6 °C) (Infrared)   Resp 16   Ht 5' 7\" (1.702 m)   Wt 278 lb 12.8 oz (126.5 kg)   SpO2 96%   BMI 43.67 kg/m²     General: Well-developed, well-nourished, in no apparent distress. Head: The head is normocephalic  Eyes:  Conjunctivae appear normal. Pupils are equal and reactive. Extraocular movements are intact. Neck: Supple with no thyromegaly or adenopathy  Respiratory: Normal respiratory effort, chest expands symmetrically, lungs are clear to auscultation bilaterally  Cardiovascular: Regular rate and rhythm. Abdomen: Soft, obese, non-distended  Psychiatric: Alert and oriented x 3, appropriate affect and behavior   Musculoskeletal: Normal gait and range of motion in all 4 extremities. Skin: Warm and dry  Lymphatics: The supraclavicular, submental, cervical and axillary regions are free of significant lymphadenopathy  Right Breast: Examined with patient seated and supine. The skin, nipple, and areola appear normal, there is no skin dimpling with movement of the pectoralis, there is no nipple retraction, no nipple discharge can be elicited, the parenchyma is mildly nodular, there are no dominant masses and the axillary tail is normal.   Left Breast: Examined with patient seated and supine. The skin, nipple, and areola appear normal, there is no skin dimpling with movement of the pectoralis, there is no nipple retraction, no nipple discharge can be elicited, the parenchyma is mildly nodular, there are no dominant masses and the axillary tail is normal. There is a benign appearing skin lesion over the left areola, at approximately 7:00. This is about 6 mm in size, flesh colored, on a broad stalk. No ulceration/bleeding noted. ----------------------------------------------    IMAGING: There is no breast imaging to review at this time. PATHOLOGY: There is no pathology to review at this time. ----------------------------------------------    IMPRESSION/RECOMMENDATION:      Yue Damon is a 30 y.o. woman who presents today for evaluation of a left areolar skin lesion. I explained to her that there are multiple etiologies for a nipple/areolar lesion including benign ones such as a skin tag, adenoma, papilloma, or malignant causes. Given her age, lack of other associated symptoms and the time for which this has been present, this is most likely benign. Clinically, this appears to be either a skin tag (fibroepithelial polyp) or an adenoma. While these are benign, many patients prefer excision for definitive diagnosis and improved cosmesis. She wishes to have this excised. Risks of the procedure including bleeding, infection, scar and recurrence were discussed. She gave verbal consent for excision of her left areolar skin lesion. The area was prepped with ChloraPrep.  2 mL of 1% lidocaine was injected. Using a 15 blade the lesion was excised. This was placed in formalin and will be sent to pathology. Following this, the wound was closed with a single interrupted 4-0 Monocryl suture. Pressure was held. A gauze dressing was applied. She tolerated this very well. She understands that the suture will fall out on its own in about 1 to 2 weeks. Okay to shower in 24 hours. No soaking in baths or pools for about 2 weeks. She will call if she has any concerns about the wound, otherwise she will follow up as needed. I encouraged her to perform self breast exams on a monthly basis. She will alert me if she has any concerns in the future. She should begin her routine screening mammograms at age 34. I answered all of her questions thoroughly and she does seem pleased with this plan of approach.   I encouraged her to contact me in the interim if any new questions or concerns arise. IN SUMMARY:  - f/u PRN  - Will call with pathology (OK to leave results on VM)    Griselda Schmitz.  Maximo Slade DO  Breast Surgical Oncology    [facility]  Phone: [phone] (option 3)

## 2020-12-07 ENCOUNTER — OFFICE VISIT (OUTPATIENT)
Dept: SURGERY | Age: 25
End: 2020-12-07
Payer: COMMERCIAL

## 2020-12-07 VITALS
BODY MASS INDEX: 43.76 KG/M2 | WEIGHT: 278.8 LBS | DIASTOLIC BLOOD PRESSURE: 87 MMHG | TEMPERATURE: 97.8 F | HEART RATE: 85 BPM | SYSTOLIC BLOOD PRESSURE: 126 MMHG | RESPIRATION RATE: 16 BRPM | OXYGEN SATURATION: 96 % | HEIGHT: 67 IN

## 2020-12-07 PROCEDURE — G8482 FLU IMMUNIZE ORDER/ADMIN: HCPCS | Performed by: SURGERY

## 2020-12-07 PROCEDURE — 11200 RMVL SKIN TAGS UP TO&INC 15: CPT | Performed by: SURGERY

## 2020-12-07 PROCEDURE — G8417 CALC BMI ABV UP PARAM F/U: HCPCS | Performed by: SURGERY

## 2020-12-07 PROCEDURE — G8427 DOCREV CUR MEDS BY ELIG CLIN: HCPCS | Performed by: SURGERY

## 2020-12-07 PROCEDURE — 99202 OFFICE O/P NEW SF 15 MIN: CPT | Performed by: SURGERY

## 2020-12-07 ASSESSMENT — ENCOUNTER SYMPTOMS
APNEA: 0
ABDOMINAL PAIN: 0
SHORTNESS OF BREATH: 0
BACK PAIN: 0
ABDOMINAL DISTENTION: 0
NAUSEA: 0
COUGH: 0
WHEEZING: 0
VOMITING: 0

## 2020-12-07 NOTE — LETTER
[facility] Breast Surgery  [address]  Phone: [phone]  Fax: [phone]    Alejandra Matos MD        December 7, 2020       Patient: Fozia Martínez   MR Number: <[id number]>   YOB: 1995   Date of Visit: 12/7/2020       Dear Dr. Corey Seklton: Thank you for the request for consultation for Marcial Freitas to me for the evaluation of her left areolar skin lesion. Below are the relevant portions of my assessment and plan of care. If you have questions, please do not hesitate to call me. I look forward to following [facility] along with you.     Sincerely,        Aylin Gonzalez, DO    [facility] providers:  Farrukh Rosenberg MD  [address] [place], [place] [address]  VIA [place]

## 2020-12-07 NOTE — PATIENT INSTRUCTIONS
We will call you with pathology results. Ok to shower in 24 hours. There is 1 stitch in place, this will fall off on its own. I recommend that you perform self breast exams once a month. Call the office with any concerns. Patient Education        Breast Self-Exam: Care Instructions  Your Care Instructions     A breast self-exam is when you check your breasts for lumps or changes. This regular exam helps you learn how your breasts normally look and feel. Most breast problems or changes are not because of cancer. Breast self-exam is not a substitute for a mammogram. Having regular breast exams by your doctor and regular mammograms improve your chances of finding any problems with your breasts. Some women set a time each month to do a step-by-step breast self-exam. Other women like a less formal system. They might look at their breasts as they brush their teeth, or feel their breasts once in a while in the shower. If you notice a change in your breast, tell your doctor. Follow-up care is a key part of your treatment and safety. Be sure to make and go to all appointments, and call your doctor if you are having problems. It's also a good idea to know your test results and keep a list of the medicines you take. How do you do a breast self-exam?  · The best time to examine your breasts is usually one week after your menstrual period begins. Your breasts should not be tender then. If you do not have periods, you might do your exam on a day of the month that is easy to remember. · To examine your breasts:  ? Remove all your clothes above the waist and lie down. When you are lying down, your breast tissue spreads evenly over your chest wall, which makes it easier to feel all your breast tissue. ? Use the pads--not the fingertips--of the 3 middle fingers of your left hand to check your right breast. Move your fingers slowly in small coin-sized circles that overlap.   ? Use three levels of pressure to feel of all your breast tissue. Use light pressure to feel the tissue close to the skin surface. Use medium pressure to feel a little deeper. Use firm pressure to feel your tissue close to your breastbone and ribs. Use each pressure level to feel your breast tissue before moving on to the next spot. ? Check your entire breast, moving up and down as if following a strip from the collarbone to the bra line, and from the armpit to the ribs. Repeat until you have covered the entire breast.  ? Repeat this procedure for your left breast, using the pads of the 3 middle fingers of your right hand. · To examine your breasts while in the shower:  ? Place one arm over your head and lightly soap your breast on that side. ? Using the pads of your fingers, gently move your hand over your breast (in the strip pattern described above), feeling carefully for any lumps or changes. ? Repeat for the other breast.  · Have your doctor inspect anything you notice to see if you need further testing. Where can you learn more? Go to https://Studiekring.[employer]. org and sign in to your [employer] account. Enter P148 in the [facility] box to learn more about \"Breast Self-Exam: Care Instructions. \"     If you do not have an account, please click on the \"Sign Up Now\" link. Current as of: April 29, 2020               Content Version: 12.6  © 8303-6426 [employer]. Care instructions adapted under license by [facility] ([facility]). If you have questions about a medical condition or this instruction, always ask your healthcare professional. [place] [address] any warranty or liability for your use of this information.

## 2020-12-11 ENCOUNTER — TELEPHONE (OUTPATIENT)
Dept: SURGERY | Age: 25
End: 2020-12-11

## 2020-12-11 NOTE — RESULT ENCOUNTER NOTE
Jessica - Please let Ms. Janell Moreno know that the skin lesion I removed in the office was just a benign skin tag - great news! Nothing further to do for this. Thanks!

## 2020-12-30 ENCOUNTER — PATIENT MESSAGE (OUTPATIENT)
Dept: FAMILY MEDICINE CLINIC | Age: 25
End: 2020-12-30
Payer: COMMERCIAL

## 2020-12-30 ENCOUNTER — OFFICE VISIT (OUTPATIENT)
Dept: PRIMARY CARE CLINIC | Age: 25
End: 2020-12-30
Payer: COMMERCIAL

## 2020-12-30 DIAGNOSIS — Z11.59 SCREENING FOR VIRAL DISEASE: Primary | ICD-10-CM

## 2020-12-30 LAB — S PYO AG THROAT QL: POSITIVE

## 2020-12-30 PROCEDURE — 99211 OFF/OP EST MAY X REQ PHY/QHP: CPT | Performed by: NURSE PRACTITIONER

## 2020-12-30 PROCEDURE — G8428 CUR MEDS NOT DOCUMENT: HCPCS | Performed by: NURSE PRACTITIONER

## 2020-12-30 PROCEDURE — G8417 CALC BMI ABV UP PARAM F/U: HCPCS | Performed by: NURSE PRACTITIONER

## 2020-12-30 PROCEDURE — 87880 STREP A ASSAY W/OPTIC: CPT | Performed by: NURSE PRACTITIONER

## 2020-12-30 RX ORDER — AZITHROMYCIN 250 MG/1
TABLET, FILM COATED ORAL
Qty: 1 PACKET | Refills: 0 | Status: SHIPPED | OUTPATIENT
Start: 2020-12-30 | End: 2021-10-25

## 2020-12-30 NOTE — PATIENT INSTRUCTIONS

## 2020-12-30 NOTE — TELEPHONE ENCOUNTER
From: Yue Damon  To: JAKOB Duron - CNP  Sent: 12/30/2020 8:53 AM EST  Subject: Non-Urgent Medical Question    I have been sick for the past two days. My symptoms are sore throat, fever, chills, swollen lymph nodes, headaches, and redness in the back of the throat. I've been alternating Tylenol and Motrin with little success in helping. It feels like strep throat. Is there a medication I can be prescribed to help me get through this or do I need to come in? Thank you.

## 2021-01-01 LAB — SARS-COV-2, NAA: NOT DETECTED

## 2021-02-23 ENCOUNTER — TELEPHONE (OUTPATIENT)
Dept: PULMONOLOGY | Age: 26
End: 2021-02-23

## 2021-02-23 ENCOUNTER — VIRTUAL VISIT (OUTPATIENT)
Dept: PULMONOLOGY | Age: 26
End: 2021-02-23
Payer: COMMERCIAL

## 2021-02-23 DIAGNOSIS — R53.83 OTHER FATIGUE: ICD-10-CM

## 2021-02-23 DIAGNOSIS — G47.33 MILD OBSTRUCTIVE SLEEP APNEA: Primary | ICD-10-CM

## 2021-02-23 DIAGNOSIS — Z71.89 CPAP USE COUNSELING: ICD-10-CM

## 2021-02-23 DIAGNOSIS — R06.83 SNORING: ICD-10-CM

## 2021-02-23 DIAGNOSIS — E66.01 OBESITY, MORBID, BMI 40.0-49.9 (HCC): ICD-10-CM

## 2021-02-23 PROCEDURE — G8427 DOCREV CUR MEDS BY ELIG CLIN: HCPCS | Performed by: NURSE PRACTITIONER

## 2021-02-23 PROCEDURE — 99213 OFFICE O/P EST LOW 20 MIN: CPT | Performed by: NURSE PRACTITIONER

## 2021-02-23 ASSESSMENT — SLEEP AND FATIGUE QUESTIONNAIRES
ESS TOTAL SCORE: 9
HOW LIKELY ARE YOU TO NOD OFF OR FALL ASLEEP WHILE SITTING AND READING: 2
HOW LIKELY ARE YOU TO NOD OFF OR FALL ASLEEP WHILE LYING DOWN TO REST IN THE AFTERNOON WHEN CIRCUMSTANCES PERMIT: 3
HOW LIKELY ARE YOU TO NOD OFF OR FALL ASLEEP IN A CAR, WHILE STOPPED FOR A FEW MINUTES IN TRAFFIC: 0
HOW LIKELY ARE YOU TO NOD OFF OR FALL ASLEEP WHEN YOU ARE A PASSENGER IN A CAR FOR AN HOUR WITHOUT A BREAK: 2

## 2021-02-23 NOTE — TELEPHONE ENCOUNTER
.Within this Telehealth Consent, the terms you and yours refer to the person using the Telehealth Service (Service), or in the case of a use of the Service by or on behalf of a minor, you and yours refer to and include (i) the parent or legal guardian who provides consent to the use of the Service by such minor or uses the Service on behalf of such minor, and (ii) the minor for whom consent is being provided or on whose behalf the Service is being utilized. When using Service, you will be consulting with your health care providers via the use of Telehealth.   Telehealth involves the delivery of healthcare services using electronic communications, information technology or other means between a healthcare provider and a patient who are not in the same physical location. Telehealth may be used for diagnosis, treatment, follow-up and/or patient education, and may include, but is not limited to, one or more of the following:    Electronic transmission of medical records, photo images, personal health information or other data between a patient and a healthcare provider    Interactions between a patient and healthcare provider via audio, video and/or data communications    Use of output data from medical devices, sound and video files    Anticipated Benefits   The use of Telehealth by your Provider(s) through the Service may have the following possible benefits:    Making it easier and more efficient for you to access medical care and treatment for the conditions treated by such Provider(s) utilizing the Service    Allowing you to obtain medical care and treatment by Provider(s) at times that are convenient for you    Enabling you to interact with Provider(s) without the necessity of an in-office appointment     Possible Risks   While the use of Telehealth can provide potential benefits for you, there are also potential risks associated with the use of Telehealth.  These risks include, but may not be limited to the following:    Your Provider(s) may not able to provide medical treatment for your particular condition and you may be required to seek alternative healthcare or emergency care services.  The electronic systems or other security protocols or safeguards used in the Service could fail, causing a breach of privacy of your medical or other information.  Given regulatory requirements in certain jurisdictions, your Provider(s) diagnosis and/or treatment options, especially pertaining to certain prescriptions, may be limited. Acceptance   1. You understand that Services will be provided via Telehealth. This process involves the use of HIPAA compliant and secure, real-time audio-visual interfacing with a qualified and appropriately trained provider located at [facility]. 2. You understand that, under no circumstances, will this session be recorded. 3. You understand that the Provider(s) at [facility] and other clinical participants will be party to the information obtained during the Telehealth session in accordance with best medical practices. 4. You understand that the information obtained during the Telehealth session will be used to help determine the most appropriate treatment options. 5. You understand that You have the right to revoke this consent at any point in time. 6. You understand that Telehealth is voluntary, and that continued treatment is not dependent upon consent. 7. You understand that, in the event of non-consent to Telehealth services and/or technical difficulties, you will obtain services as typically provided in the absence of Telehealth technology. 8. You understand that this consent will be kept in Your medical record. 9. No potential benefits from the use of Telehealth or specific results can be guaranteed. Your condition may not be cured or improved and, in some cases, may get worse.    10. There are limitations in the provision of medical care and treatment via Telehealth and the Service and you may not be able to receive diagnosis and/or treatment through the Service for every condition for which you seek diagnosis and/or treatment. 11. There are potential risks to the use of Telehealth, including but not limited to the risks described in this Telehealth Consent. 12. Your Provider(s) have discussed the use of Telehealth and the Service with you, including the benefits and risks of such and you have provided oral consent to your Provider(s) for the use of Telehealth and the Service. 15. You understand that it is your duty to provide your Provider(s) truthful, accurate and complete information, including all relevant information regarding care that you may have received or may be receiving from other healthcare providers outside of the Service. 14. You understand that each of your Provider(s) may determine in his or sole discretion that your condition is not suitable for diagnosis and/or treatment using the Service, and that you may need to seek medical care and treatment a specialist or other healthcare provider, outside of the Service. 15. You understand that you are fully responsible for payment for all services provided by Provider(s) or through use of the Service and that you may not be able to use third-party insurance. 16. You represent that (a) you have read this Telehealth Consent carefully, (b) you understand the risks and benefits of the Service and the use of Telehealth in the medical care and treatment provided to you by Provider(s) using the Service, and (c) you have the legal capacity and authority to provide this consent for yourself and/or the minor for which you are consenting under applicable federal and state laws, including laws relating to the age of [de-identified] and/or parental/guardian consent.    17. You give your informed consent to the use of Telehealth by Provider(s) using the Service under the terms described in the Terms of Service and this Telehealth Consent. The patient was read the following statement and has consented to the visit as of 2/23/21. The patient has been scheduled for their first telehealth visit on 2/23/21 with Srikanth Levy.

## 2021-02-23 NOTE — PROGRESS NOTES
Patient ID: Rica Smith is a 30 y.o. female who is being seen today for   Chief Complaint   Patient presents with    Sleep Apnea     f/u     Referring: JAKOB Recio-CNP    HPI:       Rica Smith is a 30 y.o. female for televideo appointment via video and audio doxy. me virtual visit for DIANA follow up. She was seen in 2019 and auto CPAP was ordered at that time however patient did not follow through. States now snoring worse and more sleepiness in the day and wants to pursue treatment    +daytime sleepiness. No nodding off when driving. +fatigue. Bedtime is 930 pm and rise time is 8 am. Sleep onset is few minutes. Wakes up 2-3 times at night total. 2 nocturia. It takes few minutes to fall back a sleep. Occasionally naps during the day. No headache in am. No weight gain. 2 caffienated beverages during the day. Rare alcohol. ESS is 9        Initial HPI 12/12/19  Rica Smith is a 30 y.o. female in office for sleep apnea evaluation. States she was diagnosed with DIANA in 2017 with mild DIANA at [facility]. States she got oral appliance at that time. States it is uncomfortable and wants try CPAP. States stopped using it a few months ago. States snoring is now worse than it was previously. Patient reports snoring at night for the past 5 years, worse recently. Worse in supine position. Wakes self snoring. Has witnessed apnea. Wakes up at night choking and gasping for air. No restorative sleep. Sometimes dry mouth upon awakening. Fatigue and tiredness during the day. Bedtime 9-10 pm and rise time is 7 am. It takes 15 minutes to fall asleep. 0-1 nocturia. Wakes up 0-1 times at night. It takes few minutes to fall back a sleep. Takes no nap during the day- no time. No headache in am. No car wrecks or near wrecks because of the sleepiness. No nodding off while driving. Gained 50 pounds in the past 2 years. No forgetfulness or decreased concentration. +nasal congestion at night- Flonase with some benefit. Drinks 1 caffinated beverages per day. No significant alcohol. No restless feelings in legs at night. +teeth grinding. No nightmares. No sleep walking. No night time panic attacks. No narcotics. No drug abuse. No history of depression. No history of anxiety. No history of atrial fibrillation. No history of DM. No history of HTN. No history of ischemic heart disease. No history of stroke. ESS is 11. No smoking. No FH for RLS or narcolepsy. +FH for DIANA- [facility]    Sleep Medicine 2/23/2021 12/12/2019   Sitting and reading 2 2   Watching TV 1 2   Sitting, inactive in a public place (e.g. a theatre or a meeting) 0 0   As a passenger in a car for an hour without a break 2 3   Lying down to rest in the afternoon when circumstances permit 3 3   Sitting and talking to someone 0 0   Sitting quietly after a lunch without alcohol 1 1   In a car, while stopped for a few minutes in traffic 0 0   Total score 9 11   Neck circumference - 17.25       Past Medical History:  Past Medical History:   Diagnosis Date    Herpes simplex virus (HSV) infection     cold sores    Mild pre-eclampsia, postpartum 2018    had to go back to the hospital- with second pregancy     Postpartum depression     Psychiatric problem     Sleep apnea     Stress incontinence     Varicella        Past Surgical History:        Procedure Laterality Date    BLADDER SURGERY      when she was around 3 yo       Allergies:  is allergic to sulfa antibiotics and pcn [penicillins]. Social History:    TOBACCO:   reports that she has never smoked. She has never used smokeless tobacco.  ETOH:   reports current alcohol use.     Family History:       Problem Relation Age of Onset    High Blood Pressure Mother     High Cholesterol Mother     Asthma Mother     High Cholesterol Father     High Blood Pressure Maternal Grandmother     Other Maternal Grandmother         prediabetes    No Known Problems Paternal Grandmother     No Known Problems Paternal Grandfather  Breast Cancer Maternal Aunt        Current Medications:    Current Outpatient Medications:     azithromycin (ZITHROMAX) 250 MG tablet, Take 2 tablets on day 1 and 1 tablet day 2-5, Disp: 1 packet, Rfl: 0    diclofenac (VOLTAREN) 75 MG EC tablet, Take 1 tablet by mouth 2 times daily (Patient not taking: Reported on 2/23/2021), Disp: 60 tablet, Rfl: 2      Review of Systems        Objective:   PHYSICAL EXAM:  There were no vitals taken for this visit. Physical Exam  Exam:  Gen: No acute distress, does not appear to be in pain. Appears well developed and nourished. HENT: Head is normocephalic and atraumatic. Normal appearing nose. External Ears normal.   Neck: No visualized mass. Trachea is midline   Eyes: EOM intact. No visible discharge. Resp:No visualized signs of difficulty breathing or respiratory distress, speaking in full sentences. Respiratory effort normal.  Neuro: Awake. Alert. Able to follow commands. No facial asymmetry. Psych: Oriented x 3. No anxiety. Normal affect. DATA:   11/2/17 HST AHI 12.3, low SpO2 85%    Assessment:       · Mild DIANA. No current treatment (failed oral appliance). · Snoring  · Observed sleep apnea   · Hypersomnia   · Obesity        Plan:      · Trial auto CPAP 8-16 cm H2O. Discussed DIANA treatment options including APAP therapy, oral appliances and oral airway surgery. Patient is in favor of trial of auto CPAP. · Advised to use CPAP 6-8 hrs at night and during naps. · Replacement of mask, tubing, head straps every 3-6 months or sooner if damaged. · Patient instructed to contact [employer] company for any mask, tubing or machine trouble shooting if problems arise. · Sleep hygiene  · Avoid sedatives, alcohol and caffeinated drinks at bed time. · No driving motorized vehicles or operating heavy machinery while fatigue, drowsy or sleepy-patient verbalized understanding and agrees. · Weight loss is also recommended as a long-term intervention.     · Complications of DIANA if not treated were discussed with patient patient to include systemic hypertension, pulmonary hypertension, cardiovascular morbidities, car accidents and all cause mortality. Discussed pathophysiology of DIANA with patient today  Patient education  provided regarding sleep tips     Consent for telehealth visit was obtained and is noted in chart      THIS VISIT WAS COMPLETED VIRTUALLY USING DOXY. Brock Diaz is a 30 y.o. female being evaluated by a Virtual Visit (video visit) encounter to address concerns as mentioned above. A caregiver was present when appropriate. Due to this being a TeleHealth encounter (During [facility] public health emergency), evaluation of the following organ systems was limited: Vitals/Constitutional/EENT/Resp/CV/GI//MS/Neuro/Skin/Heme-Lymph-Imm. Pursuant to the emergency declaration under the [address] [place] [address], [address] [place] [address] [facility] authority and the [employer] and [employer] Act, this Virtual Visit was conducted with patient's (and/or legal guardian's) consent, to reduce the patient's risk of exposure to COVID-19 and provide necessary medical care. The patient (and/or legal guardian) has also been advised to contact this office for worsening conditions or problems, and seek emergency medical treatment and/or call 911 if deemed necessary. Patient identification was verified at the start of the visit: Yes    Total time spent for this encounter: Not billed by time    Services were provided through a video synchronous discussion virtually to substitute for in-person clinic visit. Patient and provider were located at their individual homes. --JAKOB Griffin - CNP on 2/23/2021 at 3:23 PM    An electronic signature was used to authenticate this note.

## 2021-03-01 NOTE — TELEPHONE ENCOUNTER
Spoke to patient and she wants orders to be sent to [facility]. Orders faxed.  Pt has a 31-90 scheduled for 4/29/21

## 2021-04-29 ENCOUNTER — TELEPHONE (OUTPATIENT)
Dept: PULMONOLOGY | Age: 26
End: 2021-04-29

## 2021-04-29 ENCOUNTER — VIRTUAL VISIT (OUTPATIENT)
Dept: PULMONOLOGY | Age: 26
End: 2021-04-29
Payer: COMMERCIAL

## 2021-04-29 DIAGNOSIS — G47.33 MILD OBSTRUCTIVE SLEEP APNEA: Primary | ICD-10-CM

## 2021-04-29 DIAGNOSIS — E66.01 OBESITY, MORBID, BMI 40.0-49.9 (HCC): ICD-10-CM

## 2021-04-29 DIAGNOSIS — J30.9 ALLERGIC RHINITIS, UNSPECIFIED SEASONALITY, UNSPECIFIED TRIGGER: ICD-10-CM

## 2021-04-29 DIAGNOSIS — Z71.89 CPAP USE COUNSELING: ICD-10-CM

## 2021-04-29 PROCEDURE — 99213 OFFICE O/P EST LOW 20 MIN: CPT | Performed by: NURSE PRACTITIONER

## 2021-04-29 PROCEDURE — G8427 DOCREV CUR MEDS BY ELIG CLIN: HCPCS | Performed by: NURSE PRACTITIONER

## 2021-04-29 ASSESSMENT — SLEEP AND FATIGUE QUESTIONNAIRES
HOW LIKELY ARE YOU TO NOD OFF OR FALL ASLEEP WHILE WATCHING TV: 2
HOW LIKELY ARE YOU TO NOD OFF OR FALL ASLEEP WHILE SITTING INACTIVE IN A PUBLIC PLACE: 0
HOW LIKELY ARE YOU TO NOD OFF OR FALL ASLEEP WHEN YOU ARE A PASSENGER IN A CAR FOR AN HOUR WITHOUT A BREAK: 2

## 2021-04-29 NOTE — TELEPHONE ENCOUNTER
From   Vicky Dodson To   [facility] Sleep & Pulm Clinical Staff Sent   4/29/2021 11:37 AM   I gave [employer] a call and they confirmed that I am compliant. They said they can resend that information to you if you did not receive it yet. Thanks!

## 2021-04-29 NOTE — PROGRESS NOTES
Patient ID: Doug Mares is a 30 y.o. female who is being seen today for   Chief Complaint   Patient presents with    Sleep Apnea     31-90     Referring: JAKOB Galindo-CNP    HPI:     Doug Mares is a 30 y.o. female . for televideo appointment via video and audio doxy. me virtual visit for DIANA follow up. She started auto CPAP after last visit. States she is doing okay with CPAP.  has had allergies over past 2 weeks so has not used CPAP as much. States using flonase but inconsistently. States does feel better, more awake, more energized since using CPAP. Patient is using CPAP 4-6 hrs/night. Using humidifier. No snoring on CPAP. The pressure is well tolerated. The mask is comfortable-nasal mask. No mask leak. No significant daytime sleepiness- improved with CPAP. No nodding off when driving. No dry nose or throat. No fatigue. Bedtime is 11 pm and rise time is 8 am. Sleep onset is 15 minutes. Wakes up 1 times at night total. 0-1 nocturia. It takes few minutes to fall back a sleep. No naps during the day. No headache in am. No weight gain. 1 caffienated beverages during the day. No alcohol. ESS is 9. Previous HPI 2/23/21  Doug Mares is a 30 y.o. female for televideo appointment via video and audio doxy. me virtual visit for DIANA follow up. She was seen in 2019 and auto CPAP was ordered at that time however patient did not follow through. States now snoring worse and more sleepiness in the day and wants to pursue treatment    +daytime sleepiness. No nodding off when driving. +fatigue. Bedtime is 930 pm and rise time is 8 am. Sleep onset is few minutes. Wakes up 2-3 times at night total. 2 nocturia. It takes few minutes to fall back a sleep. Occasionally naps during the day. No headache in am. No weight gain. 2 caffienated beverages during the day. Rare alcohol. ESS is 9        Initial HPI 12/12/19  Doug Mares is a 30 y.o. female in office for sleep apnea evaluation.  States she was diagnosed with DIANA in 2017 with mild DIANA at [facility]. States she got oral appliance at that time. States it is uncomfortable and wants try CPAP. States stopped using it a few months ago. States snoring is now worse than it was previously. Patient reports snoring at night for the past 5 years, worse recently. Worse in supine position. Wakes self snoring. Has witnessed apnea. Wakes up at night choking and gasping for air. No restorative sleep. Sometimes dry mouth upon awakening. Fatigue and tiredness during the day. Bedtime 9-10 pm and rise time is 7 am. It takes 15 minutes to fall asleep. 0-1 nocturia. Wakes up 0-1 times at night. It takes few minutes to fall back a sleep. Takes no nap during the day- no time. No headache in am. No car wrecks or near wrecks because of the sleepiness. No nodding off while driving. Gained 50 pounds in the past 2 years. No forgetfulness or decreased concentration. +nasal congestion at night- Flonase with some benefit. Drinks 1 caffinated beverages per day. No significant alcohol. No restless feelings in legs at night. +teeth grinding. No nightmares. No sleep walking. No night time panic attacks. No narcotics. No drug abuse. No history of depression. No history of anxiety. No history of atrial fibrillation. No history of DM. No history of HTN. No history of ischemic heart disease. No history of stroke. ESS is 11. No smoking. No FH for RLS or narcolepsy.  +FH for DIANA- [facility]    Sleep Medicine 4/29/2021 2/23/2021 12/12/2019   Sitting and reading 2 2 2   Watching TV 2 1 2   Sitting, inactive in a public place (e.g. a theatre or a meeting) 0 0 0   As a passenger in a car for an hour without a break 2 2 3   Lying down to rest in the afternoon when circumstances permit 2 3 3   Sitting and talking to someone 0 0 0   Sitting quietly after a lunch without alcohol 1 1 1   In a car, while stopped for a few minutes in traffic 0 0 0   Total score 9 9 11   Neck circumference - - 17.25 encounter to address concerns as mentioned above. A caregiver was present when appropriate. Due to this being a TeleHealth encounter (During IHAKI-13 public health emergency), evaluation of the following organ systems was limited: Vitals/Constitutional/EENT/Resp/CV/GI//MS/Neuro/Skin/Heme-Lymph-Imm. Pursuant to the emergency declaration under the [address] [place] [address] and the [employer] and [employer] Act, this Virtual Visit was conducted with patient's (and/or legal guardian's) consent, to reduce the patient's risk of exposure to COVID-19 and provide necessary medical care. The patient (and/or legal guardian) has also been advised to contact this office for worsening conditions or problems, and seek emergency medical treatment and/or call 911 if deemed necessary. Patient identification was verified at the start of the visit: Yes    Total time spent for this encounter: Not billed by time    Services were provided through a video synchronous discussion virtually to substitute for in-person clinic visit. Patient and provider were located at their individual homes. --JAKOB Horan CNP on 4/29/2021 at 11:32 AM    An electronic signature was used to authenticate this note.

## 2021-06-22 ENCOUNTER — TELEPHONE (OUTPATIENT)
Dept: PULMONOLOGY | Age: 26
End: 2021-06-22

## 2021-06-22 ENCOUNTER — VIRTUAL VISIT (OUTPATIENT)
Dept: PULMONOLOGY | Age: 26
End: 2021-06-22
Payer: COMMERCIAL

## 2021-06-22 DIAGNOSIS — E66.01 OBESITY, MORBID, BMI 40.0-49.9 (HCC): ICD-10-CM

## 2021-06-22 DIAGNOSIS — G47.10 HYPERSOMNIA: ICD-10-CM

## 2021-06-22 DIAGNOSIS — Z71.89 CPAP USE COUNSELING: ICD-10-CM

## 2021-06-22 DIAGNOSIS — G47.33 MILD OBSTRUCTIVE SLEEP APNEA: Primary | ICD-10-CM

## 2021-06-22 DIAGNOSIS — R53.83 OTHER FATIGUE: ICD-10-CM

## 2021-06-22 PROCEDURE — 99214 OFFICE O/P EST MOD 30 MIN: CPT | Performed by: NURSE PRACTITIONER

## 2021-06-22 PROCEDURE — G8427 DOCREV CUR MEDS BY ELIG CLIN: HCPCS | Performed by: NURSE PRACTITIONER

## 2021-06-22 ASSESSMENT — SLEEP AND FATIGUE QUESTIONNAIRES
HOW LIKELY ARE YOU TO NOD OFF OR FALL ASLEEP IN A CAR, WHILE STOPPED FOR A FEW MINUTES IN TRAFFIC: 0
HOW LIKELY ARE YOU TO NOD OFF OR FALL ASLEEP WHILE SITTING AND READING: 1
HOW LIKELY ARE YOU TO NOD OFF OR FALL ASLEEP WHILE LYING DOWN TO REST IN THE AFTERNOON WHEN CIRCUMSTANCES PERMIT: 2
HOW LIKELY ARE YOU TO NOD OFF OR FALL ASLEEP WHILE SITTING QUIETLY AFTER LUNCH WITHOUT ALCOHOL: 1
HOW LIKELY ARE YOU TO NOD OFF OR FALL ASLEEP WHILE SITTING AND TALKING TO SOMEONE: 0
HOW LIKELY ARE YOU TO NOD OFF OR FALL ASLEEP WHILE WATCHING TV: 1
HOW LIKELY ARE YOU TO NOD OFF OR FALL ASLEEP WHILE SITTING INACTIVE IN A PUBLIC PLACE: 0
HOW LIKELY ARE YOU TO NOD OFF OR FALL ASLEEP WHEN YOU ARE A PASSENGER IN A CAR FOR AN HOUR WITHOUT A BREAK: 2
ESS TOTAL SCORE: 7

## 2021-06-22 NOTE — PROGRESS NOTES
Patient ID: Rodríguez Rogel is a 30 y.o. female who is being seen today for   Chief Complaint   Patient presents with    Sleep Apnea     6 week sleep      Referring: Jaylny Grijalva, JAKOB-CNP    HPI:       Rodríguez Rogel is a 30 y.o. female for televideo appointment via video and audio doxy. me virtual visit for DIANA follow up. States she is doing better with CPAP. States she is just putting daughter in \"big girl bed\" so up with her at night a lot. States she is a lot less tired when using CPAP, does have hypersomnia when not using it. Patient is using CPAP   3-6 hrs/night. Using humidifier. No snoring on CPAP. The pressure is well tolerated. The mask is comfortable- nasal mask. Minimal mask leak. No significant daytime sleepiness. No nodding off when driving. No dry nose or throat. Some fatigue. Bedtime is 9-10 pm and rise time is 7-8 am. Sleep onset is 15 minutes. Wakes up 2-3 times at night total with daughter. 0-1 nocturia. It takes few minutes to fall back a sleep. No naps during the day. No headache in am. No weight gain. 0-1 caffienated beverages during the day. Rare alcohol. ESS is 7      Previous HPI 4/29/21  Rodríguez Rogel is a 30 y.o. female . for televideo appointment via video and audio doxy. me virtual visit for DIANA follow up. She started auto CPAP after last visit. States she is doing okay with CPAP.  has had allergies over past 2 weeks so has not used CPAP as much. States using flonase but inconsistently. States does feel better, more awake, more energized since using CPAP. Patient is using CPAP 4-6 hrs/night. Using humidifier. No snoring on CPAP. The pressure is well tolerated. The mask is comfortable-nasal mask. No mask leak. No significant daytime sleepiness- improved with CPAP. No nodding off when driving. No dry nose or throat. No fatigue. Bedtime is 11 pm and rise time is 8 am. Sleep onset is 15 minutes. Wakes up 1 times at night total. 0-1 nocturia.  It takes few minutes to fall back a sleep. No naps during the day. No headache in am. No weight gain. 1 caffienated beverages during the day. No alcohol. ESS is 9. Previous HPI 2/23/21  Tyrone Galeana is a 30 y.o. female for televideo appointment via video and audio doxy. me virtual visit for DIANA follow up. She was seen in 2019 and auto CPAP was ordered at that time however patient did not follow through. States now snoring worse and more sleepiness in the day and wants to pursue treatment    +daytime sleepiness. No nodding off when driving. +fatigue. Bedtime is 930 pm and rise time is 8 am. Sleep onset is few minutes. Wakes up 2-3 times at night total. 2 nocturia. It takes few minutes to fall back a sleep. Occasionally naps during the day. No headache in am. No weight gain. 2 caffienated beverages during the day. Rare alcohol. ESS is 9        Initial HPI 12/12/19  Tyrone Galeana is a 30 y.o. female in office for sleep apnea evaluation. States she was diagnosed with DIANA in 2017 with mild DIANA at [facility]. States she got oral appliance at that time. States it is uncomfortable and wants try CPAP. States stopped using it a few months ago. States snoring is now worse than it was previously. Patient reports snoring at night for the past 5 years, worse recently. Worse in supine position. Wakes self snoring. Has witnessed apnea. Wakes up at night choking and gasping for air. No restorative sleep. Sometimes dry mouth upon awakening. Fatigue and tiredness during the day. Bedtime 9-10 pm and rise time is 7 am. It takes 15 minutes to fall asleep. 0-1 nocturia. Wakes up 0-1 times at night. It takes few minutes to fall back a sleep. Takes no nap during the day- no time. No headache in am. No car wrecks or near wrecks because of the sleepiness. No nodding off while driving. Gained 50 pounds in the past 2 years. No forgetfulness or decreased concentration. +nasal congestion at night- Flonase with some benefit.  Drinks 1 caffinated beverages Problems Paternal Grandfather     Breast Cancer Maternal Aunt        Current Medications:    Current Outpatient Medications:     azithromycin (ZITHROMAX) 250 MG tablet, Take 2 tablets on day 1 and 1 tablet day 2-5 (Patient not taking: Reported on 4/29/2021), Disp: 1 packet, Rfl: 0    diclofenac (VOLTAREN) 75 MG EC tablet, Take 1 tablet by mouth 2 times daily (Patient not taking: Reported on 2/23/2021), Disp: 60 tablet, Rfl: 2      Review of Systems        Objective:   PHYSICAL EXAM:  There were no vitals taken for this visit. Physical Exam  Exam:  Gen: No acute distress, does not appear to be in pain. Appears well developed and nourished. HENT: Head is normocephalic and atraumatic. Normal appearing nose. External Ears normal.   Neck: No visualized mass. Trachea is midline   Eyes: EOM intact. No visible discharge. Resp:No visualized signs of difficulty breathing or respiratory distress, speaking in full sentences. Respiratory effort normal.  Neuro: Awake. Alert. Able to follow commands. No facial asymmetry. Psych: Oriented x 3. No anxiety. Normal affect. DATA:   11/2/17 HST AHI 12.3, low SpO2 85%      CPAP download data:  Compliance download report from 3/30/21 to 4/28/21 reviewed today by me and showed patient is using machine 4:12 hrs/night with 53% compliance and AHI 1.7 within this time frame. 16/30days with greater than 4 hours of machine use. 90% pressure 12.1 cm H20 on auto CPAP  8-16 cm H2O     Compliance download report from 5/17/21 to 6/15/21 reviewed today by me and showed patient is using machine 3:22 hrs/night with 33% compliance and AHI 1.1 within this time frame. 10/30days with greater than 4 hours of machine use. 90% pressure 14.9 cm H20 on auto CPAP 10-16 cm H2O    Assessment:       · Mild DIANA. Auto CPAP 10-16 cm H2O.   Suboptimal compliance on review today  · Snoring-resolved on CPAP  · Observed sleep apnea -resolved on CPAP  · Hypersomnia -improving  · Allergic rhinitis · Obesity        Plan:      Send order to change to auto CPAP 10-16 cm H2O  Discussed use Flonase regularly for most benefit, follow-up with PCP if allergies worsen  Discussed CPAP acclamation techniques  · Advised to use CPAP 6-8 hrs at night and during naps  Discussed [employer] recently recalled some Pap units. Patient encouraged to call DME to see if her unit is on recall and register unit if so. At this time, if patients have moderate to severe sleep apnea or have severe daytime sleepiness -discussed with patient she has mild DIANA but if she is having severe daytime sleepiness without CPAP- it is recommended continue therapy until the machine can be replaced, if not can stop per  recommendations. Based upon the information we have so far, it appears the risk of stopping therapy (in some cases- see above) may be higher than the risks associated with foam degradation. However, there are still many unknown variables and each patient will have to make a final determination on his or her own. Patient verbalized understanding. States she will think about how she would like to proceed and will call back with any further questions regarding this matter. Please only use cleaning methods recommended by . · Replacement of mask, tubing, head straps every 3-6 months or sooner if damaged. · Patient instructed to contact DME company for any mask, tubing or machine trouble shooting if problems arise. · Sleep hygiene  · Avoid sedatives, alcohol and caffeinated drinks at bed time. · No driving motorized vehicles or operating heavy machinery while fatigue, drowsy or sleepy-patient verbalized understanding and agrees. · Weight loss is also recommended as a long-term intervention. · Complications of DIANA if not treated were discussed with patient patient to include systemic hypertension, pulmonary hypertension, cardiovascular morbidities, car accidents and all cause mortality.   Patient education provided regarding sleep tips and CPAP cleaning recommendations    Consent for telehealth visit was obtained and is noted in chart      THIS VISIT WAS COMPLETED VIRTUALLY USING DOXY. Blanca Gray is a 30 y.o. female being evaluated by a Virtual Visit (video visit) encounter to address concerns as mentioned above. A caregiver was present when appropriate. Due to this being a TeleHealth encounter (During ITEXH-79 public health emergency), evaluation of the following organ systems was limited: Vitals/Constitutional/EENT/Resp/CV/GI//MS/Neuro/Skin/Heme-Lymph-Imm. Pursuant to the emergency declaration under the [address] [place] [address], [address] [place] [address] authority and the [employer] and [employer] Act, this Virtual Visit was conducted with patient's (and/or legal guardian's) consent, to reduce the patient's risk of exposure to COVID-19 and provide necessary medical care. The patient (and/or legal guardian) has also been advised to contact this office for worsening conditions or problems, and seek emergency medical treatment and/or call 911 if deemed necessary. Patient identification was verified at the start of the visit: Yes    Total time spent for this encounter: Not billed by time    Services were provided through a video synchronous discussion virtually to substitute for in-person clinic visit. Patient and provider were located at their individual homes. --JAKOB Lanza CNP on 6/22/2021 at 2:33 PM    An electronic signature was used to authenticate this note.

## 2021-08-16 ENCOUNTER — TELEPHONE (OUTPATIENT)
Dept: PULMONOLOGY | Age: 26
End: 2021-08-16

## 2021-08-16 NOTE — TELEPHONE ENCOUNTER
Appointment canceled for Tressa Sheppard (<[id number]>)   Visit Type: VV SPECIAL USE CASE   Date        Time      Length    Provider                  Department   9/21/2021    1:20 PM  20 mins. JAKOB Villarreal - CNP      [facility]      Reason for Cancellation: Patient preference

## 2021-08-23 DIAGNOSIS — R45.86 MOOD CHANGES: Primary | ICD-10-CM

## 2021-10-25 ENCOUNTER — OFFICE VISIT (OUTPATIENT)
Dept: FAMILY MEDICINE CLINIC | Age: 26
End: 2021-10-25
Payer: COMMERCIAL

## 2021-10-25 VITALS
SYSTOLIC BLOOD PRESSURE: 112 MMHG | HEART RATE: 96 BPM | BODY MASS INDEX: 44.26 KG/M2 | OXYGEN SATURATION: 98 % | DIASTOLIC BLOOD PRESSURE: 74 MMHG | WEIGHT: 282 LBS | HEIGHT: 67 IN

## 2021-10-25 DIAGNOSIS — F41.9 ANXIETY AND DEPRESSION: ICD-10-CM

## 2021-10-25 DIAGNOSIS — F32.A ANXIETY AND DEPRESSION: ICD-10-CM

## 2021-10-25 DIAGNOSIS — Z00.00 PHYSICAL EXAM: Primary | ICD-10-CM

## 2021-10-25 LAB
BASOPHILS ABSOLUTE: 0 K/UL (ref 0–0.2)
BASOPHILS RELATIVE PERCENT: 0.4 %
EOSINOPHILS ABSOLUTE: 0.3 K/UL (ref 0–0.6)
EOSINOPHILS RELATIVE PERCENT: 2.3 %
HCT VFR BLD CALC: 41.5 % (ref 36–48)
HEMOGLOBIN: 13.3 G/DL (ref 12–16)
LYMPHOCYTES ABSOLUTE: 2.6 K/UL (ref 1–5.1)
LYMPHOCYTES RELATIVE PERCENT: 23.2 %
MCH RBC QN AUTO: 27.7 PG (ref 26–34)
MCHC RBC AUTO-ENTMCNC: 31.9 G/DL (ref 31–36)
MCV RBC AUTO: 86.8 FL (ref 80–100)
MONOCYTES ABSOLUTE: 0.4 K/UL (ref 0–1.3)
MONOCYTES RELATIVE PERCENT: 3.9 %
NEUTROPHILS ABSOLUTE: 7.8 K/UL (ref 1.7–7.7)
NEUTROPHILS RELATIVE PERCENT: 70.2 %
PDW BLD-RTO: 16 % (ref 12.4–15.4)
PLATELET # BLD: 393 K/UL (ref 135–450)
PMV BLD AUTO: 8.5 FL (ref 5–10.5)
RBC # BLD: 4.78 M/UL (ref 4–5.2)
WBC # BLD: 11.1 K/UL (ref 4–11)

## 2021-10-25 PROCEDURE — 90674 CCIIV4 VAC NO PRSV 0.5 ML IM: CPT | Performed by: NURSE PRACTITIONER

## 2021-10-25 PROCEDURE — 99395 PREV VISIT EST AGE 18-39: CPT | Performed by: NURSE PRACTITIONER

## 2021-10-25 PROCEDURE — 36415 COLL VENOUS BLD VENIPUNCTURE: CPT | Performed by: NURSE PRACTITIONER

## 2021-10-25 PROCEDURE — G8482 FLU IMMUNIZE ORDER/ADMIN: HCPCS | Performed by: NURSE PRACTITIONER

## 2021-10-25 PROCEDURE — 90471 IMMUNIZATION ADMIN: CPT | Performed by: NURSE PRACTITIONER

## 2021-10-25 RX ORDER — NORETHINDRONE ACETATE/ETHINYL ESTRADIOL AND FERROUS FUMARATE 1MG-20(21)
KIT ORAL
COMMUNITY
Start: 2021-10-07 | End: 2022-04-22 | Stop reason: ALTCHOICE

## 2021-10-25 RX ORDER — CLARITHROMYCIN 500 MG/1
TABLET, COATED ORAL
COMMUNITY
Start: 2021-10-15 | End: 2021-11-18

## 2021-10-25 RX ORDER — CITALOPRAM 20 MG/1
20 TABLET ORAL DAILY
Qty: 30 TABLET | Refills: 3 | Status: SHIPPED | OUTPATIENT
Start: 2021-10-25 | End: 2021-11-18 | Stop reason: SDUPTHER

## 2021-10-25 ASSESSMENT — PATIENT HEALTH QUESTIONNAIRE - PHQ9
SUM OF ALL RESPONSES TO PHQ QUESTIONS 1-9: 10
8. MOVING OR SPEAKING SO SLOWLY THAT OTHER PEOPLE COULD HAVE NOTICED. OR THE OPPOSITE, BEING SO FIGETY OR RESTLESS THAT YOU HAVE BEEN MOVING AROUND A LOT MORE THAN USUAL: 0
SUM OF ALL RESPONSES TO PHQ QUESTIONS 1-9: 10
10. IF YOU CHECKED OFF ANY PROBLEMS, HOW DIFFICULT HAVE THESE PROBLEMS MADE IT FOR YOU TO DO YOUR WORK, TAKE CARE OF THINGS AT HOME, OR GET ALONG WITH OTHER PEOPLE: 1
6. FEELING BAD ABOUT YOURSELF - OR THAT YOU ARE A FAILURE OR HAVE LET YOURSELF OR YOUR FAMILY DOWN: 1
5. POOR APPETITE OR OVEREATING: 3
2. FEELING DOWN, DEPRESSED OR HOPELESS: 1
7. TROUBLE CONCENTRATING ON THINGS, SUCH AS READING THE NEWSPAPER OR WATCHING TELEVISION: 0
4. FEELING TIRED OR HAVING LITTLE ENERGY: 2
SUM OF ALL RESPONSES TO PHQ QUESTIONS 1-9: 10
9. THOUGHTS THAT YOU WOULD BE BETTER OFF DEAD, OR OF HURTING YOURSELF: 0
SUM OF ALL RESPONSES TO PHQ9 QUESTIONS 1 & 2: 2
1. LITTLE INTEREST OR PLEASURE IN DOING THINGS: 1
3. TROUBLE FALLING OR STAYING ASLEEP: 2

## 2021-10-25 ASSESSMENT — COLUMBIA-SUICIDE SEVERITY RATING SCALE - C-SSRS
1. WITHIN THE PAST MONTH, HAVE YOU WISHED YOU WERE DEAD OR WISHED YOU COULD GO TO SLEEP AND NOT WAKE UP?: YES
2. HAVE YOU ACTUALLY HAD ANY THOUGHTS OF KILLING YOURSELF?: YES
3. HAVE YOU BEEN THINKING ABOUT HOW YOU MIGHT KILL YOURSELF?: NO
5. HAVE YOU STARTED TO WORK OUT OR WORKED OUT THE DETAILS OF HOW TO KILL YOURSELF? DO YOU INTEND TO CARRY OUT THIS PLAN?: NO
6. HAVE YOU EVER DONE ANYTHING, STARTED TO DO ANYTHING, OR PREPARED TO DO ANYTHING TO END YOUR LIFE?: NO
4. HAVE YOU HAD THESE THOUGHTS AND HAD SOME INTENTION OF ACTING ON THEM?: NO

## 2021-10-25 ASSESSMENT — ENCOUNTER SYMPTOMS
CONSTIPATION: 0
SHORTNESS OF BREATH: 0
DIARRHEA: 0
BACK PAIN: 1
COLOR CHANGE: 0
SORE THROAT: 0
WHEEZING: 0

## 2021-10-25 NOTE — PROGRESS NOTES
Patient ID: Santi Neff is a 30 y.o. female who presents today for a Physical Exam.      HPI  No fasting- here for physical exam    Anxiety/depression- symptoms increasing, has never been on meds before. Feels more anxious than depressed. Would like medication.   Has had some SI but no plan or attempts- has good support     Past Medical History:   Diagnosis Date    Herpes simplex virus (HSV) infection     cold sores    Mild pre-eclampsia, postpartum 2018    had to go back to the hospital- with second pregancy     Postpartum depression     Psychiatric problem     Sleep apnea     Stress incontinence     Varicella        Past Surgical History:   Procedure Laterality Date    BLADDER SURGERY      when she was around 5 yo       Family History   Problem Relation Age of Onset    High Blood Pressure Mother     High Cholesterol Mother     Asthma Mother     High Cholesterol Father     High Blood Pressure Maternal Grandmother     Other Maternal Grandmother         prediabetes    No Known Problems Paternal Grandmother     No Known Problems Paternal Grandfather     Breast Cancer Maternal Aunt           Social History     Socioeconomic History    Marital status: Single     Spouse name: None    Number of children: None    Years of education: None    Highest education level: None   Occupational History    None   Tobacco Use    Smoking status: Never Smoker    Smokeless tobacco: Never Used   Vaping Use    Vaping Use: Never used   Substance and Sexual Activity    Alcohol use: Yes     Comment: social    Drug use: Never    Sexual activity: Yes     Partners: Male     Birth control/protection: Implant     Comment: nexplanon   Other Topics Concern    None   Social History Narrative    None     Social Determinants of Health     Financial Resource Strain:     Difficulty of Paying Living Expenses:    Food Insecurity:     Worried About Running Out of Food in the Last Year:     Ran Out of Food in the Last allergies). Negative for food allergies. Neurological: Negative for dizziness, numbness and headaches. Hematological: Negative for adenopathy. Does not bruise/bleed easily. Psychiatric/Behavioral: Positive for dysphoric mood. Negative for self-injury. The patient is nervous/anxious. Physical Exam  Vitals and nursing note reviewed. Constitutional:       Appearance: Normal appearance. She is well-developed. She is obese. HENT:      Head: Normocephalic and atraumatic. Right Ear: Tympanic membrane and external ear normal.      Left Ear: Tympanic membrane and external ear normal.      Nose: Nose normal.      Mouth/Throat:      Pharynx: No oropharyngeal exudate or posterior oropharyngeal erythema. Eyes:      Conjunctiva/sclera: Conjunctivae normal.   Cardiovascular:      Rate and Rhythm: Normal rate and regular rhythm. Heart sounds: Normal heart sounds. No murmur heard. Pulmonary:      Effort: Pulmonary effort is normal. No respiratory distress. Breath sounds: Normal breath sounds. No wheezing or rales. Abdominal:      General: Bowel sounds are normal. There is no distension. Palpations: Abdomen is soft. Tenderness: There is no abdominal tenderness. There is no rebound. Musculoskeletal:         General: Normal range of motion. Cervical back: Normal range of motion and neck supple. Lymphadenopathy:      Cervical: No cervical adenopathy. Skin:     General: Skin is warm and dry. Neurological:      General: No focal deficit present. Mental Status: She is alert and oriented to person, place, and time. Deep Tendon Reflexes: Reflexes are normal and symmetric. Psychiatric:         Mood and Affect: Mood normal.         Behavior: Behavior normal.         Thought Content: Thought content normal.         Judgment: Judgment normal.         Assessment:  Encounter Diagnoses   Name Primary?  Physical exam Yes    Anxiety and depression        Plan:  1. Physical exam  - INFLUENZA, MDCK QUADV, 2 YRS AND OLDER, IM, PF, PREFILL SYR OR SDV, 0.5ML (FLUCELVAX QUADV, PF)  - CBC Auto Differential  - Comprehensive Metabolic Panel  - Hemoglobin A1C  - Lipid Panel    2. Anxiety and depression  Information given about [facility] [place] as well- encouraged making appt for that as well     - citalopram (CELEXA) 20 MG tablet; Take 1 tablet by mouth daily  Dispense: 30 tablet; Refill: 3            Return in about 1 month (around 11/25/2021) for f/u Mood.

## 2021-10-25 NOTE — PATIENT INSTRUCTIONS
Please make an appointment with one of our [address], Dr. Jarrett Paulino or Uday Cantrell MA. They will work with you to develop a plan to improve your overall well-being by addressing any behavioral or mental health factors that are influencing your health. You can schedule your appointment just like you schedule your other appointments here, at the  or over the phone. Each appointment will be 30 minutes long, and you will typically be seen every 2-4 weeks. Your insurance should cover the visit, but you may owe a specialist copay. If you would prefer to be seen by a therapist more frequently, or for a longer visit, please ask your Primary Care Provider for a recommendation.

## 2021-10-26 LAB
A/G RATIO: 1.4 (ref 1.1–2.2)
ALBUMIN SERPL-MCNC: 4 G/DL (ref 3.4–5)
ALP BLD-CCNC: 83 U/L (ref 40–129)
ALT SERPL-CCNC: 12 U/L (ref 10–40)
ANION GAP SERPL CALCULATED.3IONS-SCNC: 16 MMOL/L (ref 3–16)
AST SERPL-CCNC: 10 U/L (ref 15–37)
BILIRUB SERPL-MCNC: <0.2 MG/DL (ref 0–1)
BUN BLDV-MCNC: 13 MG/DL (ref 7–20)
CALCIUM SERPL-MCNC: 9 MG/DL (ref 8.3–10.6)
CHLORIDE BLD-SCNC: 101 MMOL/L (ref 99–110)
CHOLESTEROL, TOTAL: 168 MG/DL (ref 0–199)
CO2: 19 MMOL/L (ref 21–32)
CREAT SERPL-MCNC: 0.7 MG/DL (ref 0.6–1.1)
ESTIMATED AVERAGE GLUCOSE: 122.6 MG/DL
GFR AFRICAN AMERICAN: >60
GFR NON-AFRICAN AMERICAN: >60
GLOBULIN: 2.9 G/DL
GLUCOSE BLD-MCNC: 118 MG/DL (ref 70–99)
HBA1C MFR BLD: 5.9 %
HDLC SERPL-MCNC: 34 MG/DL (ref 40–60)
LDL CHOLESTEROL CALCULATED: 93 MG/DL
POTASSIUM SERPL-SCNC: 4.3 MMOL/L (ref 3.5–5.1)
SODIUM BLD-SCNC: 136 MMOL/L (ref 136–145)
TOTAL PROTEIN: 6.9 G/DL (ref 6.4–8.2)
TRIGL SERPL-MCNC: 207 MG/DL (ref 0–150)
VLDLC SERPL CALC-MCNC: 41 MG/DL

## 2021-10-29 ENCOUNTER — PATIENT MESSAGE (OUTPATIENT)
Dept: FAMILY MEDICINE CLINIC | Age: 26
End: 2021-10-29

## 2021-11-01 NOTE — TELEPHONE ENCOUNTER
From: Maame Cha  To: JAKOB Momin - CNP  Sent: 10/29/2021 4:36 PM EDT  Subject: Visit Follow-Up Question    Good evening,    I was just checking in to see if my form for my work was faxed over with my results from 10/25/21. Thank you!

## 2021-11-16 ENCOUNTER — OFFICE VISIT (OUTPATIENT)
Dept: PSYCHOLOGY | Age: 26
End: 2021-11-16
Payer: COMMERCIAL

## 2021-11-16 DIAGNOSIS — F32.A DEPRESSION, UNSPECIFIED DEPRESSION TYPE: ICD-10-CM

## 2021-11-16 DIAGNOSIS — F41.9 ANXIETY: Primary | ICD-10-CM

## 2021-11-16 PROCEDURE — 1036F TOBACCO NON-USER: CPT | Performed by: PSYCHOLOGIST

## 2021-11-16 PROCEDURE — 90791 PSYCH DIAGNOSTIC EVALUATION: CPT | Performed by: PSYCHOLOGIST

## 2021-11-16 NOTE — PATIENT INSTRUCTIONS
1. Pay attention to your anxious worries and write them down. 2. Schedule worry time to manage your concerns. If you do only once a week, 15-20 minutes at a time. If more than once a week, 10-15 minutes at a time. Be sure you STOP after the allotted time; use a timer to remind you or schedule something pleasant following your worry time. The idea behind this practice is to allow you to delay worries that pop up during the day by reminding yourself that you have time set aside for worrying later in the week. It takes practice to be able to delay the worry, so it's OK if you still worry outside of \"worry time. \"    If you notice you are worrying outside of your scheduled worry time, tell yourself, I have plenty of time to focus on this later. Right now Im just going to be in the moment and notice what Im doing, what others are doing, the environment, and other things I see, hear, or smell. 3. Follow up in 2 weeks.

## 2021-11-16 NOTE — PROGRESS NOTES
Behavioral Health Consultation  Tori Anders M.A. Psychology Assistant  Viridiana Ventura, Ph.D. Supervising Psychologist  11/16/2021  11:05 AM EST      Time spent with Patient: 40 minutes  This is patient's first [facility] appointment. Reason for Consult:    Chief Complaint   Patient presents with    Anxiety    Depression     Referring Provider: Marietta Donald, JAKOB - CNP  [address]    Pt provided informed consent for the behavioral health program. Discussed with patient model of service to include the limits of confidentiality (i.e. abuse reporting, suicide intervention, etc.) and short-term intervention focused approach. Reviewed provision of services under supervision of licensed psychologist and obtained written consent. Pt indicated understanding. Feedback given to PCP. TELEHEALTH VISIT -- Audio/Visual (During WBJGL-30 public health emergency)  }  Pursuant to the emergency declaration under the [address] [place] [address], [facility] authority and the [employer] Act, this Virtual Visit was conducted, with patient's consent, to reduce the patient's risk of exposure to COVID-19 and provide continuity of care for an established patient. Services were provided through a video synchronous discussion virtually to substitute for in-person clinic visit. Pt gave verbal informed consent to participate in telehealth services. Conducted a risk-benefit analysis and determined that the patient's presenting problems are consistent with the use of telepsychology. Determined that the patient has sufficient knowledge and skills in the use of technology enabling them to adequately benefit from telepsychology. It was determined that this patient was able to be properly treated without an in-person session.  Patient verified that they were currently located at the [facility] address that was provided during registration. Verified the following information:  Patient's identification: Yes  Patient location: [facility] [place]  [id number]  Patient's call back number: [phone]   Patient's emergency contact's name and number, as well as permission to contact them if needed: Extended Emergency Contact Information  Primary Emergency Contact: Chadd Giraldo  Address: [address] [facility]           [place], [address] Phone: [phone]  Relation: Spouse     Provider location: [address]:  Patient presented with concerns about depression and anxiety. Sx have been present for 6 months. Pt reported feeling nervous and anxious, reported finding it difficult to stop worrying, and reported being easily irritable and annoyed. Pt also reported interpersonal issues in her marriage and financial concerns. She reported that her  lost his job and is dealing with a medical condition. Pt reported that they can no longer afford their home and will be moving out soon. Patient reported depressive symptoms, including deactivation, anhedonia, poor self-worth, social isolation, insomnia/hypersomnia and fatigue. Pt reported low self-esteem and low confidence since she was a teenager. Pt also reported past suicidal ideation but denied current ideation. Pt also denied past suicidal attempt and self-harm. Goals for treatment incude managing anxiety symptoms, becoming more proactive and reducing procrastination. Patient lives with  and two daughters. Patient works full-time as a registered nurse. Daily routine is stable. Pt caffeine use is about 1 can of diet coke per day. Pt denies cigarette use and illegal drug use. Pt reported having 2 alcoholic drinks per month. Pt reported inadequate social support. Patient enjoys the following hobbies: watching movies, playing video games, and reading. Patient describes stable sleep pattern with 6-7 hours of sleep per night.     Pt has been using Celexa for about 3 weeks and reported some benefits. Pt and [facility] reviewed worry management strategies. O:  MSE:    Appearance: good hygiene   Attitude: cooperative and friendly  Consciousness: alert  Orientation: oriented to person, place, time, general circumstance  Memory: recent and remote memory intact  Attention/Concentration: intact during session  Psychomotor Activity:normal  Eye Contact: normal  Speech: normal rate and volume, well-articulated  Mood: stable  Affect: euthymic  Perception: within normal limits  Thought Content: intrusive thoughts  Thought Process: logical, coherent and goal-directed  Insight: good  Judgment: intact  Ability to understand instructions: Yes  Ability to respond meaningfully: Yes  Morbid Ideation: no   Suicide Assessment: no suicidal ideation, plan, or intent  Homicidal Ideation: no    History:    Medications:   Current Outpatient Medications   Medication Sig Dispense Refill    clarithromycin (BIAXIN) 500 MG tablet       MOSHE FE 1/20 1-20 MG-MCG per tablet       citalopram (CELEXA) 20 MG tablet Take 1 tablet by mouth daily 30 tablet 3     No current facility-administered medications for this visit.      Social History:   Social History     Socioeconomic History    Marital status: Single     Spouse name: Not on file    Number of children: Not on file    Years of education: Not on file    Highest education level: Not on file   Occupational History    Not on file   Tobacco Use    Smoking status: Never Smoker    Smokeless tobacco: Never Used   Vaping Use    Vaping Use: Never used   Substance and Sexual Activity    Alcohol use: Yes     Comment: social    Drug use: Never    Sexual activity: Yes     Partners: Male     Birth control/protection: Implant     Comment: nexplanon   Other Topics Concern    Not on file   Social History Narrative    Not on file     Social Determinants of Health     Financial Resource Strain:     Difficulty of Paying Living Expenses: Not on file   Food Insecurity:     Worried About [address] [place] [facility] [employer] in the Last Year: Not on file    Jigna of Food in the Last Year: Not on file   Transportation Needs:     Lack of Transportation (Medical): Not on file    Lack of Transportation (Non-Medical): Not on file   Physical Activity:     Days of Exercise per Week: Not on file    Minutes of Exercise per Session: Not on file   Stress:     Feeling of Stress : Not on file   Social Connections:     Frequency of Communication with Friends and Family: Not on file    Frequency of Social Gatherings with Friends and Family: Not on file    Attends Gnosticism Services: Not on file    Active Member of [address] [place] [address] [place] [facility] [employer] or Organizations: Not on file    Attends Club or Organization Meetings: Not on file    Marital Status: Not on file   Intimate Partner Violence:     Fear of Current or Ex-Partner: Not on file    Emotionally Abused: Not on file    Physically Abused: Not on file    Sexually Abused: Not on file   Housing Stability:     Unable to Pay for Housing in the Last Year: Not on file    Number of [place] in the Last Year: Not on file    Unstable Housing in the Last Year: Not on file     TOBACCO:   reports that she has never smoked. She has never used smokeless tobacco.  ETOH:   reports current alcohol use. Family History:   Family History   Problem Relation Age of Onset    High Blood Pressure Mother     High Cholesterol Mother     Asthma Mother     High Cholesterol Father     High Blood Pressure Maternal Grandmother     Other Maternal Grandmother         prediabetes    No Known Problems Paternal Grandmother     No Known Problems Paternal Grandfather     Breast Cancer Maternal Aunt        A:    Diagnosis:    1. Anxiety    2.  Depression, unspecified depression type          Diagnosis Date    Herpes simplex virus (HSV) infection     cold sores    Mild pre-eclampsia, postpartum 2018    had to go back to the hospital- with second pregancy     Postpartum depression     Psychiatric problem     Sleep apnea     Stress incontinence     Varicella      Plan:  Pt interventions:  Established rapport, Conducted functional assessment, [place]-setting to identify pt's primary goals for [facility] visit / overall health, Supportive techniques and CBT to target anxious worries    Pt Behavioral Change Plan:   See Pt Instructions

## 2021-11-18 ENCOUNTER — OFFICE VISIT (OUTPATIENT)
Dept: FAMILY MEDICINE CLINIC | Age: 26
End: 2021-11-18
Payer: COMMERCIAL

## 2021-11-18 VITALS
BODY MASS INDEX: 43.7 KG/M2 | WEIGHT: 279 LBS | HEART RATE: 95 BPM | SYSTOLIC BLOOD PRESSURE: 116 MMHG | OXYGEN SATURATION: 98 % | DIASTOLIC BLOOD PRESSURE: 82 MMHG

## 2021-11-18 DIAGNOSIS — F41.9 ANXIETY AND DEPRESSION: Primary | ICD-10-CM

## 2021-11-18 DIAGNOSIS — F32.A ANXIETY AND DEPRESSION: Primary | ICD-10-CM

## 2021-11-18 PROCEDURE — G8417 CALC BMI ABV UP PARAM F/U: HCPCS | Performed by: NURSE PRACTITIONER

## 2021-11-18 PROCEDURE — 1036F TOBACCO NON-USER: CPT | Performed by: NURSE PRACTITIONER

## 2021-11-18 PROCEDURE — G8482 FLU IMMUNIZE ORDER/ADMIN: HCPCS | Performed by: NURSE PRACTITIONER

## 2021-11-18 PROCEDURE — 99213 OFFICE O/P EST LOW 20 MIN: CPT | Performed by: NURSE PRACTITIONER

## 2021-11-18 PROCEDURE — G8427 DOCREV CUR MEDS BY ELIG CLIN: HCPCS | Performed by: NURSE PRACTITIONER

## 2021-11-18 RX ORDER — CITALOPRAM 40 MG/1
40 TABLET ORAL DAILY
Qty: 90 TABLET | Refills: 2 | Status: SHIPPED | OUTPATIENT
Start: 2021-11-18 | End: 2022-01-13 | Stop reason: SDUPTHER

## 2021-11-18 ASSESSMENT — ENCOUNTER SYMPTOMS
SHORTNESS OF BREATH: 0
WHEEZING: 0

## 2021-11-18 NOTE — PROGRESS NOTES
Patient: Abdiaziz Leon is a 27 y.o. female who presents today with the following Chief Complaint(s):  Chief Complaint   Patient presents with    1 Month Follow-Up     depression anxiety, possible increase          HPI   Depression with anxiety: still has some anxiety but improved- no negative thoughts- has been having some hot flashes but tolerable. Seeing [facility] as well. Would like to increase celexa    Current Outpatient Medications   Medication Sig Dispense Refill    citalopram (CELEXA) 40 MG tablet Take 1 tablet by mouth daily 90 tablet 2    MOSHE FE 1/20 1-20 MG-MCG per tablet        No current facility-administered medications for this visit. Patient's past medical history, surgical history, family history, medications,  andallergies  were all reviewed and updated as appropriate today. Review of Systems   Constitutional:        Hot flashes   Respiratory: Negative for shortness of breath and wheezing. Cardiovascular: Negative for chest pain and palpitations. Psychiatric/Behavioral: Negative for dysphoric mood. The patient is nervous/anxious. Physical Exam  Vitals and nursing note reviewed. Constitutional:       Appearance: Normal appearance. She is well-developed. HENT:      Head: Normocephalic and atraumatic. Right Ear: External ear normal.      Left Ear: External ear normal.      Nose: Nose normal.      Mouth/Throat:      Pharynx: No oropharyngeal exudate or posterior oropharyngeal erythema. Eyes:      Conjunctiva/sclera: Conjunctivae normal.   Cardiovascular:      Rate and Rhythm: Normal rate and regular rhythm. Heart sounds: Normal heart sounds. No murmur heard. Pulmonary:      Effort: Pulmonary effort is normal. No respiratory distress. Breath sounds: Normal breath sounds. No wheezing or rales. Musculoskeletal:         General: Normal range of motion. Cervical back: Normal range of motion and neck supple.    Lymphadenopathy:      Cervical: No cervical adenopathy. Skin:     General: Skin is warm and dry. Neurological:      General: No focal deficit present. Mental Status: She is alert and oriented to person, place, and time. Deep Tendon Reflexes: Reflexes are normal and symmetric. Psychiatric:         Mood and Affect: Mood normal.         Behavior: Behavior normal.         Thought Content: Thought content normal.         Judgment: Judgment normal.       Vitals:    11/18/21 1003   BP: 116/82   Pulse: 95   SpO2: 98%       Assessment:  Encounter Diagnosis   Name Primary?  Anxiety and depression        Plan:  1. Anxiety and depression    - citalopram (CELEXA) 40 MG tablet; Take 1 tablet by mouth daily  Dispense: 90 tablet; Refill: 2        Return in about 1 month (around 12/18/2021) for f/u mood.

## 2021-11-30 ENCOUNTER — OFFICE VISIT (OUTPATIENT)
Dept: PSYCHOLOGY | Age: 26
End: 2021-11-30
Payer: COMMERCIAL

## 2021-11-30 DIAGNOSIS — F41.9 ANXIETY: Primary | ICD-10-CM

## 2021-11-30 PROCEDURE — 98968 PH1 ASSMT&MGMT NQHP 21-30: CPT | Performed by: PSYCHOLOGIST

## 2021-11-30 NOTE — PATIENT INSTRUCTIONS
1. Review the constructive worry worksheet and consider repeating steps for other worries that you experience. 2. Practice relaxation exercises daily, at bedtime if possible. You may want to set an alarm on your phone to remind you do to these. You could also download an cat to assist with your relaxation practice. Suggestions include \"Calm\", \"Headspace\", or \"Stop, Breathe, & Think. \"   2. Return to see Roxana Rios in 2 weeks. Constructive Worry Worksheet  Concerns Solutions     1. Not getting a good deal on the selling the house. 2.  Stressed with academic work. 3. Interpersonal difficulties     1. I'm engaging in home improvement activities. 2. I'm searching for an affordable realtor        3. I'm working on [employer]      1. I will split tasks into multiple small parts and reward self for completing tasks. 2. I will  laptop and look at the work that needs to get it. 3. To get started, I will write without thinking about the quality of the paper. I can always go back to make edits. 1. Consider journaling about relationship difficulties. Pay attention to thoughts that cause you to feel overwhelming emotions. 2. __________________________        3.  __________________________           Constructive Worry Instructions  When we have challenges, we tend to use our problem-solving skills to make our lives better and to relieve ourselves of anxiety. It is not surprising that some of us may use our problem-solving skills at the wrong time and place, namely bedtime. We may think about a problem, trying to solve it, but unfortunately this will oftentimes keep us awake. Constructive worry is a method for managing the tendency to worry during that quiet time when sleep is supposed to be taking over. Do this exercise early in the evening (at least 2 hours before bed). It should take only about 15 minutes to complete.   Here is how it is done:  1. Write down the problem(s) facing you that has the greatest chance of keeping you awake a bedtime, and list them in the Concerns column of the [address]. 2. Then, think of the next step that might help fix it. Write it down in the Solutions column. This need not be the final solution to the problem, since most problems have to be solved by taking steps anyhow, and you will be doing this again tomorrow night and the night after until you finally get to the best solution.  If you know how to fix the problem completely, then write that down.  If you decide that this is not really a big problem, and you will just deal with it when the time comes, then write that down.  If you decide that you simply do not know what to do about it, and need to ask someone to help you, write that down.  If you decide that it is a problem, but there seems to be no good solution at all, and that you will just have to live with it, write that down, with a note to yourself that maybe sometime soon you or someone you speak with will give you a clue that will lead you to a solution. 3. Repeat this for any other concerns you have. 4. Fold the Constructive Worry Worksheet in half and place it on the nightstand next to your bed and forget about it until bedtime.       5. At bedtime, if you begin to worry actually tell yourself that you have dealt with your problems already in the best way you know how, and when you were at your problem-solving best.  Remind yourself that you will be working on them again tomorrow evening and that nothing you can do while you are so tired can help you any more than what you have already done; more effort will only make the matters worst.

## 2021-11-30 NOTE — PROGRESS NOTES
Behavioral Health Consultation  Jann Mc M.A. Psychology Assistant  Jamie Maynard, Ph.D. Supervising Psychologist  11/30/2021  1:20 PM EST      Time spent with Patient: 40 minutes  This is patient's second [facility] appointment. Reason for Consult:    Chief Complaint   Patient presents with    Anxiety    Depression     Referring Provider: Halle Alba, APRN - CNP  [address] [facility],  [address] [facility]    Pt provided informed consent for the behavioral health program. Discussed with patient model of service to include the limits of confidentiality (i.e. abuse reporting, suicide intervention, etc.) and short-term intervention focused approach. Reviewed provision of services under supervision of licensed psychologist and obtained written consent. Pt indicated understanding. Feedback given to PCP. TELEHEALTH VISIT -- Audio Only (During OGWZF-93 public health emergency)  }  Pursuant to the emergency declaration under the [facility] [place] [address] waiver authority and the [employer] and [employer] Act, this phone call was conducted, with patient's consent, to reduce the patient's risk of exposure to COVID-19 and provide continuity of care for an established patient. Services were provided through a phone call discussion to substitute for in-person clinic visit. Pt gave verbal informed consent to participate in telehealth services. Consent:  She and/or health care decision maker is aware that that she may receive a bill for this telephone service, depending on her insurance coverage, and has provided verbal consent to proceed: Yes    Conducted a risk-benefit analysis and determined that the patient's presenting problems are consistent with the use of telepsychology. Determined that the patient has sufficient knowledge and skills in the use of technology enabling them to adequately benefit from telepsychology.  It was determined that this patient was able to be properly treated without an in-person session. Patient verified that they were currently located at the [facility] address that was provided during registration. Verified the following information:  Patient's identification: Yes  Patient location: [facility] [place] [address]  Patient's call back number: [phone]   Patient's emergency contact's name and number, as well as permission to contact them if needed: Extended Emergency Contact Information  Primary Emergency Contact: Aleksandr Gonzalez  Address: [address], [address] [facility],[address] Phone: [phone]  Relation: Spouse     Provider location: [place], [address] [place] [address] affirm this is an episode with a patient who has not had a related appointment within my department in the past 7 days or scheduled within the next 24 hours. S:  Pt reported improvements to anxiety symptoms (due to medication changes) and rated her anxiety as a 4 on a scale of 1-10. Pt also reported adequate sleep and appetite. Pt and [facility] reviewed Pt's anxious worries which were mostly about her financial, academic, and interpersonal difficulties. Pt denied feelings of hopelessness, but reported feeling stressed. Pt and [facility] identified Pt's anxious worries and reviewed the constructive worry worksheet.      O:  MSE:    Appearance: Not assessed  Attitude: cooperative and friendly  Consciousness: alert  Orientation: oriented to person, place, time, general circumstance  Memory: recent and remote memory intact  Attention/Concentration: intact during session  Psychomotor Activity:normal  Eye Contact: Not assessed  Speech: normal rate and volume, well-articulated  Mood: stable  Affect: euthymic  Perception: within normal limits  Thought Content: intrusive thoughts  Thought Process: logical, coherent and goal-directed  Insight: good  Judgment: intact  Ability to understand instructions: Yes  Ability to respond meaningfully: Yes  Morbid Ideation: no   Suicide Assessment: no suicidal ideation, plan, or intent  Homicidal Ideation: no    History:    Medications:   Current Outpatient Medications   Medication Sig Dispense Refill    citalopram (CELEXA) 40 MG tablet Take 1 tablet by mouth daily 90 tablet 2    MOSHE FE 1/20 1-20 MG-MCG per tablet        No current facility-administered medications for this visit. Social History:   Social History     Socioeconomic History    Marital status: Single     Spouse name: Not on file    Number of children: Not on file    Years of education: Not on file    Highest education level: Not on file   Occupational History    Not on file   Tobacco Use    Smoking status: Never Smoker    Smokeless tobacco: Never Used   Vaping Use    Vaping Use: Never used   Substance and Sexual Activity    Alcohol use: Yes     Comment: social    Drug use: Never    Sexual activity: Yes     Partners: Male     Birth control/protection: Implant     Comment: nexplanon   Other Topics Concern    Not on file   Social History Narrative    Not on file     Social Determinants of Health     Financial Resource Strain:     Difficulty of Paying Living Expenses: Not on file   Food Insecurity:     Worried About Running Out of Food in the Last Year: Not on file    Jigna of Food in the Last Year: Not on file   Transportation Needs:     Lack of Transportation (Medical): Not on file    Lack of Transportation (Non-Medical):  Not on file   Physical Activity:     Days of Exercise per Week: Not on file    Minutes of Exercise per Session: Not on file   Stress:     Feeling of Stress : Not on file   Social Connections:     Frequency of Communication with Friends and Family: Not on file    Frequency of Social Gatherings with Friends and Family: Not on file    Attends Pentecostal Services: Not on file    Active Member of Clubs or Organizations: Not on file    Attends Club or Organization Meetings: Not on file    Marital Status: Not on file   Intimate Partner Violence:     Fear of Current or Ex-Partner: Not on file    Emotionally Abused: Not on file    Physically Abused: Not on file    Sexually Abused: Not on file   Housing Stability:     Unable to Pay for Housing in the Last Year: Not on file    Number of [place] in the Last Year: Not on file    Unstable Housing in the Last Year: Not on file     TOBACCO:   reports that she has never smoked. She has never used smokeless tobacco.  ETOH:   reports current alcohol use. Family History:   Family History   Problem Relation Age of Onset    High Blood Pressure Mother     High Cholesterol Mother     Asthma Mother     High Cholesterol Father     High Blood Pressure Maternal Grandmother     Other Maternal Grandmother         prediabetes    No Known Problems Paternal Grandmother     No Known Problems Paternal Grandfather     Breast Cancer Maternal Aunt        A:    Diagnosis:    1.  Anxiety          Diagnosis Date    Herpes simplex virus (HSV) infection     cold sores    Mild pre-eclampsia, postpartum 2018    had to go back to the hospital- with second pregancy     Postpartum depression     Psychiatric problem     Sleep apnea     Stress incontinence     Varicella      Plan:  Pt interventions:  Established rapport, Conducted functional assessment, Supportive techniques, CBT to target anxious worries and Identified maladaptive thoughts    Pt Behavioral Change Plan:   See Pt Instructions

## 2021-12-05 ENCOUNTER — E-VISIT (OUTPATIENT)
Dept: PRIMARY CARE CLINIC | Age: 26
End: 2021-12-05
Payer: COMMERCIAL

## 2021-12-05 DIAGNOSIS — N30.00 ACUTE CYSTITIS WITHOUT HEMATURIA: Primary | ICD-10-CM

## 2021-12-05 PROCEDURE — 99422 OL DIG E/M SVC 11-20 MIN: CPT | Performed by: INTERNAL MEDICINE

## 2021-12-05 RX ORDER — NITROFURANTOIN 25; 75 MG/1; MG/1
100 CAPSULE ORAL 2 TIMES DAILY
Qty: 20 CAPSULE | Refills: 0 | Status: SHIPPED | OUTPATIENT
Start: 2021-12-05 | End: 2021-12-15

## 2021-12-05 RX ORDER — PHENAZOPYRIDINE HYDROCHLORIDE 100 MG/1
100 TABLET, FILM COATED ORAL 3 TIMES DAILY PRN
Qty: 9 TABLET | Refills: 0 | Status: SHIPPED | OUTPATIENT
Start: 2021-12-05 | End: 2021-12-08

## 2021-12-05 NOTE — PROGRESS NOTES
I believe that you have a UTI. Take the antibiotic as directed that will be sent to the pharmacy. Pyridium will also be sent to the pharmacy for the pain with urination. It will turn your urine orange. Follow up with your primary care provider. 11 to 20 minutes were spent on this E visit.

## 2021-12-08 ENCOUNTER — PATIENT MESSAGE (OUTPATIENT)
Dept: FAMILY MEDICINE CLINIC | Age: 26
End: 2021-12-08

## 2021-12-08 NOTE — TELEPHONE ENCOUNTER
From: Henna Corea  To: Merari Craft  Sent: 12/8/2021 12:01 PM EST  Subject: Virtual Visit    Good afternoon,    I was wondering if I could set up a virtual visit with Dr. Kimmie Quiroz for a follow-up after increasing my medication. She asked for a 1 month follow-up from the November 18th visit and said it could be virtual. I just didn't see how to do that on the portal. Thank you!

## 2021-12-28 ENCOUNTER — VIRTUAL VISIT (OUTPATIENT)
Dept: FAMILY MEDICINE CLINIC | Age: 26
End: 2021-12-28
Payer: COMMERCIAL

## 2021-12-28 DIAGNOSIS — F41.9 ANXIETY: Primary | ICD-10-CM

## 2021-12-28 PROCEDURE — 1036F TOBACCO NON-USER: CPT | Performed by: NURSE PRACTITIONER

## 2021-12-28 PROCEDURE — G8482 FLU IMMUNIZE ORDER/ADMIN: HCPCS | Performed by: NURSE PRACTITIONER

## 2021-12-28 PROCEDURE — G8427 DOCREV CUR MEDS BY ELIG CLIN: HCPCS | Performed by: NURSE PRACTITIONER

## 2021-12-28 PROCEDURE — 99213 OFFICE O/P EST LOW 20 MIN: CPT | Performed by: NURSE PRACTITIONER

## 2021-12-28 PROCEDURE — G8417 CALC BMI ABV UP PARAM F/U: HCPCS | Performed by: NURSE PRACTITIONER

## 2021-12-28 RX ORDER — BUSPIRONE HYDROCHLORIDE 5 MG/1
5 TABLET ORAL 3 TIMES DAILY PRN
Qty: 90 TABLET | Refills: 3 | Status: SHIPPED | OUTPATIENT
Start: 2021-12-28 | End: 2022-01-13 | Stop reason: SDUPTHER

## 2021-12-28 ASSESSMENT — ENCOUNTER SYMPTOMS: SHORTNESS OF BREATH: 0

## 2021-12-28 NOTE — PROGRESS NOTES
2021    TELEHEALTH EVALUATION -- Audio/Visual (During LHGQY-45 public health emergency)    HPI:    Aramis Martínez (:  1995) has requested an audio/video evaluation for the following concern(s): Anxiety: sometimes has some more anxiety- just tries to relax but sometimes has a hard time calming down. Overall feels like the celexa helps. Seeing Lore Kayser as well   Review of Systems   Constitutional: Negative for chills and fever. Respiratory: Negative for shortness of breath. Cardiovascular: Negative for chest pain. Psychiatric/Behavioral: Negative for dysphoric mood. The patient is nervous/anxious. Prior to Visit Medications    Medication Sig Taking?  Authorizing Provider   busPIRone (BUSPAR) 5 MG tablet Take 1 tablet by mouth 3 times daily as needed (anxiety) Yes JAKOB Del Castillo CNP   citalopram (CELEXA) 40 MG tablet Take 1 tablet by mouth daily Yes JAKOB Del Castillo CNP FE  1-20 MG-MCG per tablet  Yes Historical Provider, MD       Social History     Tobacco Use    Smoking status: Never Smoker    Smokeless tobacco: Never Used   Vaping Use    Vaping Use: Never used   Substance Use Topics    Alcohol use: Yes     Comment: social    Drug use: Never        Allergies   Allergen Reactions    Sulfa Antibiotics Other (See Comments)     Unsure of reaction    Pcn [Penicillins] Rash   ,   Past Medical History:   Diagnosis Date    Anxiety     Herpes simplex virus (HSV) infection     cold sores    Mild pre-eclampsia, postpartum 2018    had to go back to the hospital- with second pregancy     Postpartum depression     Psychiatric problem     Sleep apnea     Stress incontinence     Varicella    ,   Past Surgical History:   Procedure Laterality Date    BLADDER SURGERY      when she was around 3 yo   ,   Family History   Problem Relation Age of Onset    High Blood Pressure Mother     High Cholesterol Mother     Asthma Mother     High Cholesterol Father  High Blood Pressure Maternal Grandmother     Other Maternal Grandmother         prediabetes    No Known Problems Paternal Grandmother     No Known Problems Paternal Grandfather     Breast Cancer Maternal Aunt        PHYSICAL EXAMINATION:  [ INSTRUCTIONS:  \"[x]\" Indicates a positive item  \"[]\" Indicates a negative item  -- DELETE ALL ITEMS NOT EXAMINED]  Vital Signs: (As obtained by patient/caregiver or practitioner observation)    Blood pressure-  Heart rate-    Respiratory rate-    Temperature-  Pulse oximetry-     Constitutional: [x] Appears well-developed and well-nourished [x] No apparent distress      [] Abnormal-   Mental status  [x] Alert and awake  [x] Oriented to person/place/time [x]Able to follow commands      Eyes:  EOM    []  Normal  [] Abnormal-  Sclera  [x]  Normal  [] Abnormal -         Discharge []  None visible  [] Abnormal -    HENT:   [x] Normocephalic, atraumatic. [] Abnormal   [] Mouth/Throat: Mucous membranes are moist.     External Ears [] Normal  [] Abnormal-     Neck: [] No visualized mass     Pulmonary/Chest: [x] Respiratory effort normal.  [x] No visualized signs of difficulty breathing or respiratory distress        [] Abnormal-      Musculoskeletal:   [] Normal gait with no signs of ataxia         [x] Normal range of motion of neck        [] Abnormal-       Neurological:        [x] No Facial Asymmetry (Cranial nerve 7 motor function) (limited exam to video visit)          [x] No gaze palsy        [] Abnormal-         Skin:        [x] No significant exanthematous lesions or discoloration noted on facial skin         [] Abnormal-            Psychiatric:       [x] Normal Affect [x] No Hallucinations        [] Abnormal-     Other pertinent observable physical exam findings-     ASSESSMENT/PLAN:  1. Anxiety  Continue celexa 40mg daily as well  Can take buspar PRN  Continue seeing lloyd Pulido busPIRone (BUSPAR) 5 MG tablet;  Take 1 tablet by mouth 3 times daily as needed (anxiety) Dispense: 90 tablet; Refill: 3      No follow-ups on file. Abdiaziztasha Leon, was evaluated through a synchronous (real-time) audio-video encounter. The patient (or guardian if applicable) is aware that this is a billable service. Verbal consent to proceed has been obtained within the past 12 months. The visit was conducted pursuant to the emergency declaration under the [address] [place] [address] and the [employer] General Act. Patient identification was verified, and a caregiver was present when appropriate. The patient was located in a state where the provider was credentialed to provide care. Total time spent on this encounter: Not billed by time    --JAKOB Gipson CNP on 12/28/2021 at 7:05 PM    An electronic signature was used to authenticate this note.

## 2022-01-11 ENCOUNTER — OFFICE VISIT (OUTPATIENT)
Dept: PSYCHOLOGY | Age: 27
End: 2022-01-11
Payer: COMMERCIAL

## 2022-01-11 DIAGNOSIS — F41.9 ANXIETY: Primary | ICD-10-CM

## 2022-01-11 DIAGNOSIS — F32.A DEPRESSION, UNSPECIFIED DEPRESSION TYPE: ICD-10-CM

## 2022-01-11 PROCEDURE — 90832 PSYTX W PT 30 MINUTES: CPT | Performed by: PSYCHOLOGIST

## 2022-01-11 PROCEDURE — 1036F TOBACCO NON-USER: CPT | Performed by: PSYCHOLOGIST

## 2022-01-11 NOTE — PROGRESS NOTES
Behavioral Health Consultation  Alexandro Mercer M.A. Psychology Assistant  Jimbo Recio, Ph.D. Supervising Psychologist  1/11/2022  2:20 PM EST      Time spent with Patient: 30 minutes  This is patient's third [facility] appointment. Reason for Consult:    Chief Complaint   Patient presents with    Depression    Anxiety     Referring Provider: JAKOB Bernstein - CNP  [address] [facility],  [address] [facility]    Pt provided informed consent for the behavioral health program. Discussed with patient model of service to include the limits of confidentiality (i.e. abuse reporting, suicide intervention, etc.) and short-term intervention focused approach. Reviewed provision of services under supervision of licensed psychologist and obtained written consent. Pt indicated understanding. Feedback given to PCP. TELEHEALTH VISIT -- Audio/Visual (During [facility]-91 public health emergency)  }  Pursuant to the emergency declaration under the [facility][note 3][address] [place] [address] [facility] authority and the [employer] and [employer] Act, this Virtual Visit was conducted, with patient's consent, to reduce the patient's risk of exposure to COVID-19 and provide continuity of care for an established patient. Services were provided through a video synchronous discussion virtually to substitute for in-person clinic visit. Pt gave verbal informed consent to participate in telehealth services. Conducted a risk-benefit analysis and determined that the patient's presenting problems are consistent with the use of telepsychology. Determined that the patient has sufficient knowledge and skills in the use of technology enabling them to adequately benefit from telepsychology. It was determined that this patient was able to be properly treated without an in-person session.  Patient verified that they were currently located at the [facility] address that was provided during registration. Verified the following information:  Patient's identification: Yes  Patient location: [facility] [place] [address]   Patient's call back number: [phone]   Patient's emergency contact's name and number, as well as permission to contact them if needed: Extended Emergency Contact Information  Primary Emergency Contact: Pao Last  Address: [address] [facility], [address] [facility],[address] Phone: [phone]  Relation: Spouse     Provider location: [address]:  Pt reported sleep improvements due to implementing a sleep schedule, and reported having adequate appetite. Pt reported stress related to interpersonal conflicts with her , and attempting to sell her home. Pt reported minimal improvement in her anxiety symptoms and rated her anxiety as a 4 on a scale of 1-10. Pt also reported feeling down, losing interesting in activities, and experiencing fatigue. Pt identified her relationship conflicts as her most troubling concern. Pt reported losing control over her emotions, and yelling during conversations and arguments. Pt also reported withdrawing from her  when she is in a negative mood. Pt reported feeling like she is at fault for the difficulties in her marriage. Pt and [facility] processed Pt's marital concerns and explored strategies for managing and reducing conflicts in her relationship.     O:  MSE:    Appearance: disheveled  Attitude: cooperative and friendly  Consciousness: alert  Orientation: oriented to person, place, time, general circumstance  Memory: recent and remote memory intact  Attention/Concentration: intact during session  Psychomotor Activity:normal  Eye Contact: normal  Speech: normal rate and volume, well-articulated  Mood: stable  Affect: euthymic  Perception: within normal limits  Thought Content: all-or-none thinking  Thought Process: logical, coherent and goal-directed  Insight: good  Judgment: intact  Ability to understand instructions: Yes  Ability to respond meaningfully: Yes  Morbid Ideation: no   Suicide Assessment: no suicidal ideation, plan, or intent  Homicidal Ideation: no    History:    Medications:   Current Outpatient Medications   Medication Sig Dispense Refill    busPIRone (BUSPAR) 5 MG tablet Take 1 tablet by mouth 3 times daily as needed (anxiety) 90 tablet 3    citalopram (CELEXA) 40 MG tablet Take 1 tablet by mouth daily 90 tablet 2    MOSHE FE 1/20 1-20 MG-MCG per tablet        No current facility-administered medications for this visit. Social History:   Social History     Socioeconomic History    Marital status: Single     Spouse name: Not on file    Number of children: Not on file    Years of education: Not on file    Highest education level: Not on file   Occupational History    Not on file   Tobacco Use    Smoking status: Never Smoker    Smokeless tobacco: Never Used   Vaping Use    Vaping Use: Never used   Substance and Sexual Activity    Alcohol use: Yes     Comment: social    Drug use: Never    Sexual activity: Yes     Partners: Male     Birth control/protection: Implant     Comment: nexplanon   Other Topics Concern    Not on file   Social History Narrative    Not on file     Social Determinants of Health     Financial Resource Strain:     Difficulty of Paying Living Expenses: Not on file   Food Insecurity:     Worried About Running Out of Food in the Last Year: Not on file    Jigna of Food in the Last Year: Not on file   Transportation Needs:     Lack of Transportation (Medical): Not on file    Lack of Transportation (Non-Medical):  Not on file   Physical Activity:     Days of Exercise per Week: Not on file    Minutes of Exercise per Session: Not on file   Stress:     Feeling of Stress : Not on file   Social Connections:     Frequency of Communication with Friends and Family: Not on file    Frequency of Social Gatherings with Friends and Family: Not on file   Luis Elaine Attends [employer] Services: Not on file    Active Member of Clubs or Organizations: Not on file    Attends Club or Organization Meetings: Not on file    Marital Status: Not on file   Intimate Partner Violence:     Fear of Current or Ex-Partner: Not on file    Emotionally Abused: Not on file    Physically Abused: Not on file    Sexually Abused: Not on file   Housing Stability:     Unable to Pay for Housing in the Last Year: Not on file    Number of [place] in the Last Year: Not on file    Unstable Housing in the Last Year: Not on file     TOBACCO:   reports that she has never smoked. She has never used smokeless tobacco.  ETOH:   reports current alcohol use. Family History:   Family History   Problem Relation Age of Onset    High Blood Pressure Mother     High Cholesterol Mother     Asthma Mother     High Cholesterol Father     High Blood Pressure Maternal Grandmother     Other Maternal Grandmother         prediabetes    No Known Problems Paternal Grandmother     No Known Problems Paternal Grandfather     Breast Cancer Maternal Aunt        A:  Pt Denies SI. Diagnosis:    1. Anxiety    2.  Depression, unspecified depression type          Diagnosis Date    Anxiety     Herpes simplex virus (HSV) infection     cold sores    Mild pre-eclampsia, postpartum 2018    had to go back to the hospital- with second pregancy     Postpartum depression     Psychiatric problem     Sleep apnea     Stress incontinence     Varicella      Plan:  Pt interventions:  Established rapport, Conducted functional assessment, [place]-setting to identify pt's primary goals for [facility] visit / overall health, Supportive techniques and CBT to target interpersonal conflicts    Pt Behavioral Change Plan:   See Pt Instructions

## 2022-01-11 NOTE — PATIENT INSTRUCTIONS
1. Review list of things that you could do to improve interpersonal relationship. 2. Monitor your emotions towards your partner. Write down what the triggering emotion, behavior, or fear is when you notice yourself getting emotionally overwhelmed with him. 3. Return to see Renu Mar in 2 weeks. Shruthi's ideas on some ways to improve relationship with her   - Listening more to her . (I.e. being more involved to the conversation, being more present)  - Being more emotionally connected to her   - Utilize breaks when having difficult conversations.    - Consider practicing deep breathing exercises when taking these breaks or feeling emotionally overwhelmed

## 2022-01-13 DIAGNOSIS — F41.9 ANXIETY: ICD-10-CM

## 2022-01-13 DIAGNOSIS — F41.9 ANXIETY AND DEPRESSION: ICD-10-CM

## 2022-01-13 DIAGNOSIS — F32.A ANXIETY AND DEPRESSION: ICD-10-CM

## 2022-01-13 RX ORDER — CITALOPRAM 40 MG/1
40 TABLET ORAL DAILY
Qty: 90 TABLET | Refills: 2 | Status: SHIPPED | OUTPATIENT
Start: 2022-01-13

## 2022-01-13 RX ORDER — BUSPIRONE HYDROCHLORIDE 5 MG/1
5 TABLET ORAL 3 TIMES DAILY PRN
Qty: 90 TABLET | Refills: 3 | Status: SHIPPED | OUTPATIENT
Start: 2022-01-13 | End: 2022-02-12

## 2022-01-13 NOTE — TELEPHONE ENCOUNTER
Fax request from [address] [place] [address] Mireille requesting a new RX for    citalopram (CELEXA) 40 MG tablet    busPIRone (BUSPAR) 5 MG tablet    Send to [address] Visit  -  12.28.21 NB  Next Office Visit  -

## 2022-01-22 ENCOUNTER — E-VISIT (OUTPATIENT)
Dept: FAMILY MEDICINE CLINIC | Age: 27
End: 2022-01-22
Payer: COMMERCIAL

## 2022-01-22 DIAGNOSIS — R39.9 UTI SYMPTOMS: Primary | ICD-10-CM

## 2022-01-22 PROCEDURE — 99421 OL DIG E/M SVC 5-10 MIN: CPT | Performed by: NURSE PRACTITIONER

## 2022-01-22 RX ORDER — NITROFURANTOIN 25; 75 MG/1; MG/1
100 CAPSULE ORAL 2 TIMES DAILY
Qty: 10 CAPSULE | Refills: 0 | Status: SHIPPED | OUTPATIENT
Start: 2022-01-22 | End: 2022-01-27

## 2022-01-22 RX ORDER — PHENAZOPYRIDINE HYDROCHLORIDE 200 MG/1
200 TABLET, FILM COATED ORAL 3 TIMES DAILY PRN
Qty: 6 TABLET | Refills: 0 | Status: SHIPPED | OUTPATIENT
Start: 2022-01-22 | End: 2022-01-24

## 2022-02-22 ENCOUNTER — E-VISIT (OUTPATIENT)
Dept: PRIMARY CARE CLINIC | Age: 27
End: 2022-02-22
Payer: COMMERCIAL

## 2022-02-22 DIAGNOSIS — J01.90 ACUTE BACTERIAL SINUSITIS: Primary | ICD-10-CM

## 2022-02-22 DIAGNOSIS — B96.89 ACUTE BACTERIAL SINUSITIS: Primary | ICD-10-CM

## 2022-02-22 PROCEDURE — 99421 OL DIG E/M SVC 5-10 MIN: CPT | Performed by: NURSE PRACTITIONER

## 2022-02-22 RX ORDER — AZITHROMYCIN 250 MG/1
TABLET, FILM COATED ORAL
Qty: 6 TABLET | Refills: 0 | Status: SHIPPED | OUTPATIENT
Start: 2022-02-22 | End: 2022-03-04

## 2022-02-22 ASSESSMENT — LIFESTYLE VARIABLES: SMOKING_STATUS: NO, I'VE NEVER SMOKED

## 2022-02-23 NOTE — PROGRESS NOTES
Reviewed questionnaire     Reviewed meds/allergies    Dx Sinusitis    Plan Rx given for zpack, follow up with PCP if no improvement    Time spent on visit 5 min

## 2022-02-25 ENCOUNTER — OFFICE VISIT (OUTPATIENT)
Dept: PSYCHOLOGY | Age: 27
End: 2022-02-25
Payer: COMMERCIAL

## 2022-02-25 DIAGNOSIS — F32.1 CURRENT MODERATE EPISODE OF MAJOR DEPRESSIVE DISORDER, UNSPECIFIED WHETHER RECURRENT (HCC): Primary | ICD-10-CM

## 2022-02-25 DIAGNOSIS — F41.9 ANXIETY: ICD-10-CM

## 2022-02-25 PROCEDURE — 1036F TOBACCO NON-USER: CPT | Performed by: PSYCHOLOGIST

## 2022-02-25 PROCEDURE — 90832 PSYTX W PT 30 MINUTES: CPT | Performed by: PSYCHOLOGIST

## 2022-02-25 ASSESSMENT — PATIENT HEALTH QUESTIONNAIRE - PHQ9
SUM OF ALL RESPONSES TO PHQ QUESTIONS 1-9: 13
SUM OF ALL RESPONSES TO PHQ9 QUESTIONS 1 & 2: 3
SUM OF ALL RESPONSES TO PHQ QUESTIONS 1-9: 14
5. POOR APPETITE OR OVEREATING: 2
1. LITTLE INTEREST OR PLEASURE IN DOING THINGS: 1
3. TROUBLE FALLING OR STAYING ASLEEP: 2
SUM OF ALL RESPONSES TO PHQ QUESTIONS 1-9: 14
SUM OF ALL RESPONSES TO PHQ QUESTIONS 1-9: 14
9. THOUGHTS THAT YOU WOULD BE BETTER OFF DEAD, OR OF HURTING YOURSELF: 1
6. FEELING BAD ABOUT YOURSELF - OR THAT YOU ARE A FAILURE OR HAVE LET YOURSELF OR YOUR FAMILY DOWN: 2
7. TROUBLE CONCENTRATING ON THINGS, SUCH AS READING THE NEWSPAPER OR WATCHING TELEVISION: 1
4. FEELING TIRED OR HAVING LITTLE ENERGY: 2
8. MOVING OR SPEAKING SO SLOWLY THAT OTHER PEOPLE COULD HAVE NOTICED. OR THE OPPOSITE, BEING SO FIGETY OR RESTLESS THAT YOU HAVE BEEN MOVING AROUND A LOT MORE THAN USUAL: 1
2. FEELING DOWN, DEPRESSED OR HOPELESS: 2

## 2022-02-25 ASSESSMENT — ANXIETY QUESTIONNAIRES
4. TROUBLE RELAXING: 1
GAD7 TOTAL SCORE: 9
5. BEING SO RESTLESS THAT IT IS HARD TO SIT STILL: 0
3. WORRYING TOO MUCH ABOUT DIFFERENT THINGS: 2
6. BECOMING EASILY ANNOYED OR IRRITABLE: 2
2. NOT BEING ABLE TO STOP OR CONTROL WORRYING: 1
1. FEELING NERVOUS, ANXIOUS, OR ON EDGE: 2
7. FEELING AFRAID AS IF SOMETHING AWFUL MIGHT HAPPEN: 1

## 2022-02-25 NOTE — PROGRESS NOTES
Behavioral Health Consultation  Fany Stark M.A. Psychology Assistant  No Grey, Ph.D. Supervising Psychologist  2/25/2022  2:07 PM EST      Time spent with Patient: 30 minutes  This is patient's fourth  [facility] appointment. Reason for Consult:    Chief Complaint   Patient presents with    Anxiety    Depression     Referring Provider: Mateo Flowers, APRN - CNP  [address] [facility] [address],  [address] [facility]    Pt provided informed consent for the behavioral health program. Discussed with patient model of service to include the limits of confidentiality (i.e. abuse reporting, suicide intervention, etc.) and short-term intervention focused approach. Reviewed provision of services under supervision of licensed psychologist and obtained written consent. Pt indicated understanding. Feedback given to PCP. S:  Pt reports that her symptoms have not improved since the last visit. Pt reports concerns about having \"covert narcissistic personality disorder\". Pt reports that she often fakes empathy. She also reports focusing more on her interests in her marriage, and getting upset with her  for doing things that she is also guilty of. She reports expecting special favors from people, getting envious and jealous very easily, and insisting on getting the best of everything. Pt reports that she tries to manipulate others to get what she wants, and seeking sympathy from others. Pt does not have an exaggerated sense of self importance, does not exaggerate her achievements and talents, and does not believe she is superior than others. Pt reports interpersonal issues with her  approximately twice a week, and believes that she is the cause of most of their fights. Pt reports getting defensive during their disagreements and reports wanting to improve her communication skills. Pt is stressed out by the possibility of having NPD and would like some clarification.  Pt reported that her  also thinks she has NPD.    O:  MSE:    Appearance: good hygiene   Attitude: cooperative and friendly  Consciousness: alert  Orientation: oriented to person, place, time, general circumstance  Memory: recent and remote memory intact  Attention/Concentration: intact during session  Psychomotor Activity:normal  Eye Contact: normal  Speech: normal rate and volume, well-articulated  Mood: normal  Affect: euthymic  Perception: within normal limits  Thought Content: intrusive thoughts  Thought Process: logical, coherent and goal-directed  Insight: good  Judgment: intact  Ability to understand instructions: Yes  Ability to respond meaningfully: Yes  Morbid Ideation: passive thoughts of death  Suicide Assessment: no suicidal ideation, plan, or intent  Homicidal Ideation: no    History:    Medications:   Current Outpatient Medications   Medication Sig Dispense Refill    azithromycin (ZITHROMAX) 250 MG tablet 2 tablets by mouth on day one. Then, 1 tablet by mouth daily for days 2-5. 6 tablet 0    citalopram (CELEXA) 40 MG tablet Take 1 tablet by mouth daily 90 tablet 2    MOSHE FE 1/20 1-20 MG-MCG per tablet        No current facility-administered medications for this visit.      Social History:   Social History     Socioeconomic History    Marital status: Single     Spouse name: Not on file    Number of children: Not on file    Years of education: Not on file    Highest education level: Not on file   Occupational History    Not on file   Tobacco Use    Smoking status: Never Smoker    Smokeless tobacco: Never Used   Vaping Use    Vaping Use: Never used   Substance and Sexual Activity    Alcohol use: Yes     Comment: social    Drug use: Never    Sexual activity: Yes     Partners: Male     Birth control/protection: Implant     Comment: nexplanon   Other Topics Concern    Not on file   Social History Narrative    Not on file     Social Determinants of Health     Financial Resource Strain:     Difficulty of Paying Living Expenses: Not on file   Food Insecurity:     Worried About [address] in the Last Year: Not on file    Jigna of Food in the Last Year: Not on file   Transportation Needs:     Lack of Transportation (Medical): Not on file    Lack of Transportation (Non-Medical): Not on file   Physical Activity:     Days of Exercise per Week: Not on file    Minutes of Exercise per Session: Not on file   Stress:     Feeling of Stress : Not on file   Social Connections:     Frequency of Communication with Friends and Family: Not on file    Frequency of Social Gatherings with Friends and Family: Not on file    Attends [employer] Services: Not on file    Active Member of [address] [place] [address] [employer] or Organizations: Not on file    Attends Club or Organization Meetings: Not on file    Marital Status: Not on file   Intimate Partner Violence:     Fear of Current or Ex-Partner: Not on file    Emotionally Abused: Not on file    Physically Abused: Not on file    Sexually Abused: Not on file   Housing Stability:     Unable to Pay for Housing in the Last Year: Not on file    Number of [place] in the Last Year: Not on file    Unstable Housing in the Last Year: Not on file     TOBACCO:   reports that she has never smoked. She has never used smokeless tobacco.  ETOH:   reports current alcohol use. Family History:   Family History   Problem Relation Age of Onset    High Blood Pressure Mother     High Cholesterol Mother     Asthma Mother     High Cholesterol Father     High Blood Pressure Maternal Grandmother     Other Maternal Grandmother         prediabetes    No Known Problems Paternal Grandmother     No Known Problems Paternal Grandfather     Breast Cancer Maternal Aunt        A:  Administered PHQ-9 and ANDRÉS-7 (see below). Patient endorses moderate symptoms of depression and mild symptoms of anxiety.  Pt reported having passive thoughts of death after disagreements with her  but denies current SI.     PHQ Scores 2/25/2022 10/25/2021 10/6/2020 11/4/2019   PHQ2 Score 3 2 0 0   PHQ9 Score 14 10 0 0     Interpretation of Total Score Depression Severity: 1-4 = Minimal depression, 5-9 = Mild depression, 10-14 = Moderate depression, 15-19 = Moderately severe depression, 20-27 = Severe depression     ANDRÉS 7 SCORE 2/25/2022   ANDRÉS-7 Total Score 9     Interpretation of ANDRÉS-7 score: 5-9 = mild anxiety, 10-14 = moderate anxiety, 15+ = severe anxiety. Recommend referral to behavioral health for scores 10 or greater. Diagnosis:    1. Current moderate episode of major depressive disorder, unspecified whether recurrent ([facility]    2. Anxiety          Diagnosis Date    Anxiety     Herpes simplex virus (HSV) infection     cold sores    Mild pre-eclampsia, postpartum 2018    had to go back to the hospital- with second pregancy     Postpartum depression     Psychiatric problem     Sleep apnea     Stress incontinence     Varicella        Plan:  Pt interventions:  Established rapport, Conducted functional assessment, [place]-setting to identify pt's primary goals for [facility] visit / overall health and Supportive techniques. Discussed strategies for improving communication with .     Pt Behavioral Change Plan:   See Pt Instructions

## 2022-02-25 NOTE — PATIENT INSTRUCTIONS
1. Keep track of situations or instances where you perceive yourself to be acting in a way that is narcissistic. Write details of the incident, how you responded, and how your  responded. 2. Return to see Santiago Glasgow in 2 week.

## 2022-03-07 ENCOUNTER — HOSPITAL ENCOUNTER (INPATIENT)
Age: 27
LOS: 1 days | Discharge: HOME | DRG: 885 | End: 2022-03-08
Attending: PSYCHIATRY & NEUROLOGY | Admitting: PSYCHIATRY & NEUROLOGY
Payer: COMMERCIAL

## 2022-03-07 DIAGNOSIS — N39.0 URINARY TRACT INFECTION WITH HEMATURIA, SITE UNSPECIFIED: ICD-10-CM

## 2022-03-07 DIAGNOSIS — R45.851 SUICIDAL IDEATION: Primary | ICD-10-CM

## 2022-03-07 DIAGNOSIS — R31.9 URINARY TRACT INFECTION WITH HEMATURIA, SITE UNSPECIFIED: ICD-10-CM

## 2022-03-07 PROBLEM — F32.A DEPRESSION: Status: ACTIVE | Noted: 2022-03-07

## 2022-03-07 PROBLEM — F33.9 MDD (MAJOR DEPRESSIVE DISORDER), RECURRENT EPISODE (HCC): Status: ACTIVE | Noted: 2022-03-07

## 2022-03-07 LAB
A/G RATIO: 1.1 (ref 1.1–2.2)
ACETAMINOPHEN LEVEL: <5 UG/ML (ref 10–30)
ALBUMIN SERPL-MCNC: 3.9 G/DL (ref 3.4–5)
ALP BLD-CCNC: 90 U/L (ref 40–129)
ALT SERPL-CCNC: 12 U/L (ref 10–40)
AMORPHOUS: ABNORMAL /HPF
AMPHETAMINE SCREEN, URINE: NORMAL
ANION GAP SERPL CALCULATED.3IONS-SCNC: 11 MMOL/L (ref 3–16)
AST SERPL-CCNC: 13 U/L (ref 15–37)
BACTERIA: ABNORMAL /HPF
BARBITURATE SCREEN URINE: NORMAL
BASOPHILS ABSOLUTE: 0.1 K/UL (ref 0–0.2)
BASOPHILS RELATIVE PERCENT: 0.6 %
BENZODIAZEPINE SCREEN, URINE: NORMAL
BILIRUB SERPL-MCNC: 0.3 MG/DL (ref 0–1)
BILIRUBIN URINE: NEGATIVE
BLOOD, URINE: ABNORMAL
BUN BLDV-MCNC: 11 MG/DL (ref 7–20)
CALCIUM SERPL-MCNC: 9.2 MG/DL (ref 8.3–10.6)
CANNABINOID SCREEN URINE: NORMAL
CHLORIDE BLD-SCNC: 104 MMOL/L (ref 99–110)
CLARITY: ABNORMAL
CO2: 22 MMOL/L (ref 21–32)
COCAINE METABOLITE SCREEN URINE: NORMAL
COLOR: YELLOW
CREAT SERPL-MCNC: 0.7 MG/DL (ref 0.6–1.1)
EOSINOPHILS ABSOLUTE: 0.1 K/UL (ref 0–0.6)
EOSINOPHILS RELATIVE PERCENT: 0.8 %
EPITHELIAL CELLS, UA: ABNORMAL /HPF (ref 0–5)
ETHANOL: NORMAL MG/DL (ref 0–0.08)
GFR AFRICAN AMERICAN: >60
GFR NON-AFRICAN AMERICAN: >60
GLUCOSE BLD-MCNC: 109 MG/DL (ref 70–99)
GLUCOSE URINE: NEGATIVE MG/DL
HCG QUALITATIVE: NEGATIVE
HCT VFR BLD CALC: 41.2 % (ref 36–48)
HEMOGLOBIN: 13.4 G/DL (ref 12–16)
INFLUENZA A: NOT DETECTED
INFLUENZA B: NOT DETECTED
KETONES, URINE: NEGATIVE MG/DL
LEUKOCYTE ESTERASE, URINE: ABNORMAL
LYMPHOCYTES ABSOLUTE: 2 K/UL (ref 1–5.1)
LYMPHOCYTES RELATIVE PERCENT: 14.5 %
Lab: NORMAL
MCH RBC QN AUTO: 27.5 PG (ref 26–34)
MCHC RBC AUTO-ENTMCNC: 32.4 G/DL (ref 31–36)
MCV RBC AUTO: 84.7 FL (ref 80–100)
METHADONE SCREEN, URINE: NORMAL
MICROSCOPIC EXAMINATION: YES
MONOCYTES ABSOLUTE: 0.4 K/UL (ref 0–1.3)
MONOCYTES RELATIVE PERCENT: 3.1 %
NEUTROPHILS ABSOLUTE: 11.3 K/UL (ref 1.7–7.7)
NEUTROPHILS RELATIVE PERCENT: 81 %
NITRITE, URINE: NEGATIVE
OPIATE SCREEN URINE: NORMAL
OXYCODONE URINE: NORMAL
PDW BLD-RTO: 15.3 % (ref 12.4–15.4)
PH UA: 5.5
PH UA: 5.5 (ref 5–8)
PHENCYCLIDINE SCREEN URINE: NORMAL
PLATELET # BLD: 354 K/UL (ref 135–450)
PMV BLD AUTO: 8.3 FL (ref 5–10.5)
POTASSIUM REFLEX MAGNESIUM: 4 MMOL/L (ref 3.5–5.1)
PROCALCITONIN: 0.04 NG/ML (ref 0–0.15)
PROPOXYPHENE SCREEN: NORMAL
PROTEIN UA: NEGATIVE MG/DL
RBC # BLD: 4.86 M/UL (ref 4–5.2)
RBC UA: ABNORMAL /HPF (ref 0–4)
SALICYLATE, SERUM: <0.3 MG/DL (ref 15–30)
SARS-COV-2 RNA, RT PCR: NOT DETECTED
SODIUM BLD-SCNC: 137 MMOL/L (ref 136–145)
SPECIFIC GRAVITY UA: >=1.03 (ref 1–1.03)
TOTAL PROTEIN: 7.4 G/DL (ref 6.4–8.2)
URINE REFLEX TO CULTURE: YES
URINE TYPE: ABNORMAL
UROBILINOGEN, URINE: 0.2 E.U./DL
WBC # BLD: 14 K/UL (ref 4–11)
WBC UA: ABNORMAL /HPF (ref 0–5)

## 2022-03-07 PROCEDURE — 85025 COMPLETE CBC W/AUTO DIFF WBC: CPT

## 2022-03-07 PROCEDURE — 36415 COLL VENOUS BLD VENIPUNCTURE: CPT

## 2022-03-07 PROCEDURE — 84703 CHORIONIC GONADOTROPIN ASSAY: CPT

## 2022-03-07 PROCEDURE — 1240000000 HC EMOTIONAL WELLNESS R&B

## 2022-03-07 PROCEDURE — 6370000000 HC RX 637 (ALT 250 FOR IP): Performed by: NURSE PRACTITIONER

## 2022-03-07 PROCEDURE — 6370000000 HC RX 637 (ALT 250 FOR IP): Performed by: PSYCHIATRY & NEUROLOGY

## 2022-03-07 PROCEDURE — 82077 ASSAY SPEC XCP UR&BREATH IA: CPT

## 2022-03-07 PROCEDURE — 80053 COMPREHEN METABOLIC PANEL: CPT

## 2022-03-07 PROCEDURE — 87086 URINE CULTURE/COLONY COUNT: CPT

## 2022-03-07 PROCEDURE — 81001 URINALYSIS AUTO W/SCOPE: CPT

## 2022-03-07 PROCEDURE — 84145 PROCALCITONIN (PCT): CPT

## 2022-03-07 PROCEDURE — 80143 DRUG ASSAY ACETAMINOPHEN: CPT

## 2022-03-07 PROCEDURE — 99284 EMERGENCY DEPT VISIT MOD MDM: CPT

## 2022-03-07 PROCEDURE — 80307 DRUG TEST PRSMV CHEM ANLYZR: CPT

## 2022-03-07 PROCEDURE — 87636 SARSCOV2 & INF A&B AMP PRB: CPT

## 2022-03-07 PROCEDURE — 80179 DRUG ASSAY SALICYLATE: CPT

## 2022-03-07 RX ORDER — POLYETHYLENE GLYCOL 3350 17 G
2 POWDER IN PACKET (EA) ORAL
Status: DISCONTINUED | OUTPATIENT
Start: 2022-03-07 | End: 2022-03-08 | Stop reason: HOSPADM

## 2022-03-07 RX ORDER — TRAZODONE HYDROCHLORIDE 50 MG/1
50 TABLET ORAL NIGHTLY PRN
Status: DISCONTINUED | OUTPATIENT
Start: 2022-03-07 | End: 2022-03-08 | Stop reason: HOSPADM

## 2022-03-07 RX ORDER — NITROFURANTOIN 25; 75 MG/1; MG/1
100 CAPSULE ORAL ONCE
Status: COMPLETED | OUTPATIENT
Start: 2022-03-07 | End: 2022-03-07

## 2022-03-07 RX ORDER — NITROFURANTOIN 25; 75 MG/1; MG/1
100 CAPSULE ORAL 2 TIMES DAILY
Status: ON HOLD | COMMUNITY
End: 2022-03-08 | Stop reason: SDUPTHER

## 2022-03-07 RX ORDER — HYDROXYZINE 50 MG/1
50 TABLET, FILM COATED ORAL 3 TIMES DAILY PRN
Status: DISCONTINUED | OUTPATIENT
Start: 2022-03-07 | End: 2022-03-08 | Stop reason: HOSPADM

## 2022-03-07 RX ORDER — ACETAMINOPHEN 325 MG/1
650 TABLET ORAL EVERY 4 HOURS PRN
Status: DISCONTINUED | OUTPATIENT
Start: 2022-03-07 | End: 2022-03-08 | Stop reason: HOSPADM

## 2022-03-07 RX ADMIN — ACETAMINOPHEN 650 MG: 325 TABLET ORAL at 20:59

## 2022-03-07 RX ADMIN — TRAZODONE HYDROCHLORIDE 50 MG: 50 TABLET ORAL at 21:23

## 2022-03-07 RX ADMIN — NITROFURANTOIN MONOHYDRATE/MACROCRYSTALS 100 MG: 75; 25 CAPSULE ORAL at 17:37

## 2022-03-07 ASSESSMENT — PAIN SCALES - GENERAL
PAINLEVEL_OUTOF10: 5
PAINLEVEL_OUTOF10: 3
PAINLEVEL_OUTOF10: 0

## 2022-03-07 ASSESSMENT — SLEEP AND FATIGUE QUESTIONNAIRES
DO YOU USE A SLEEP AID: NO
AVERAGE NUMBER OF SLEEP HOURS: -1
DO YOU HAVE DIFFICULTY SLEEPING: NO

## 2022-03-07 ASSESSMENT — ENCOUNTER SYMPTOMS
FACIAL SWELLING: 0
RHINORRHEA: 0
SHORTNESS OF BREATH: 0
COLOR CHANGE: 0
SORE THROAT: 0
ABDOMINAL PAIN: 0

## 2022-03-07 ASSESSMENT — PATIENT HEALTH QUESTIONNAIRE - PHQ9: SUM OF ALL RESPONSES TO PHQ QUESTIONS 1-9: 21

## 2022-03-07 ASSESSMENT — LIFESTYLE VARIABLES: HISTORY_ALCOHOL_USE: YES

## 2022-03-07 NOTE — ED NOTES
Pt is juli for safety in the hospital setting. [employer] [place], NP notified and suicide precautions were discontinued.       [address], [place]  03/07/22 3368

## 2022-03-07 NOTE — ED NOTES
Presenting Problem: Patient presents to [facility] voluntarily after she was brought in by her mother. Pt reports increased depression over the last few months. She and her  have recently been arguing more frequently and discussing separation. Today they got into another argument and she believes now they will separate for sure. Today she started researching on the internet how much Celexa she would need to overdose on to kill herself. She reached out to her mother before taking the pills and asked for help. She was started on Celexa a few months ago by her PCP but does not believe it is helping for her depression and anxiety. She reports recent difficulty with caring for her personal hygiene and also feels she does not spend as much time with her children. She currently lives with her mother and states she pushes the children off on her mother frequently. She does not want to get out of bed and spends much of her free time in bed or on the couch. She reports feelings of hopelessness and feeling as though she would be better off dead. Appearance/Hygiene:  hospital attire, lying in bed, fair grooming and fair hygiene   Motor Behavior: WNL   Attitude: cooperative, tearful throughout assessment  Affect: depressed affect   Speech: normal pitch and normal volume  Mood: depressed and sad   Thought Processes: Goal directed  Perceptions: Absent   Thought content: hopelessness    Orientation: A&Ox4   Memory: intact  Concentration: Fair    Insight/ judgement: impaired judgment      Psychosocial and contextual factors: Pt and her  are in the process of selling their home so they have been living with her mother. She has two children ages 1 and 11 and states her mother cares for them often. 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 She denies difficulty functioning at work. She and her  have been discussing the possibility of separation.  She believes with their most recent argument today, the plan to separate will now happen. C-SSRS flowsheet is complete. Psychiatric History (including current outpatient provider and past inpatient admissions): Pt denies all hx of suicide attempts and psychiatric hospitalizations. She currently has psychology appointments through her physicians office with psychologist, Dr. Steffen Espinosa. She is managed with medication by her PCP and states she was started on Celexa a few months ago. She states the dose has been increased to 40 mg but she still does not feel it is effective. She is also supposed to take Buspar 5 mg, \"as needed,\" for anxiety but states she does not take this consistently. Pt reports previous diagnoses of Anxiety and Depression. She also believes she has a personality disorder but denies any diagnosis by a physician.      Access to Firearms: Denies current access; states firearm is locked up and only  has the key    ASSESSMENT FOR IMMINENT FUTURE DANGER:    RISK FACTORS:    []  Age <25 or >49   []  Male gender   []  Depressed mood   []  Active suicidal ideation   [x]  Suicide plan   []  Suicide attempt   [x]  Access to lethal means   []  Prior suicide attempt   []  Active substance abuse    []  Highly impulsive behaviors   [x]  Not attending to self-care/ADLs    []  Recent significant loss   []  Chronic pain or medical illness   []  Social isolation   []  History of violence    []  Active psychosis   []  Cognitive impairment    []  No outpatient services in place   [x]  Medication noncompliance   []  No collateral information to support safety  [] Self- injurious/ Self-harm behavior    PROTECTIVE FACTORS:  [x] Age >25 and <55  [x] Female gender   [] Denies depression  [] Denies suicidal ideation  [] Does not have lethal plan   [] Does not have access to guns or weapons  [] Patient is verbally juli for safety  [x] No prior suicide attempts  [x] No active substance abuse  [x] Patient has social or family support  [x] No active psychosis or cognitive dysfunction  [x] Physically healthy  [x] Has outpatient services in place  [] Compliant with recommended medications  [] Collateral information from. .. supports patient safety   [] Patient is future oriented with plans to. ..            [address] [place] [address]  03/07/22 8400

## 2022-03-07 NOTE — ED NOTES
Level of Care Disposition: Admit      Patient was seen by ED provider and [facility] staff. The case presented to psychiatric provider on-call Dr. Emi Henson. Based on the ED evaluation and information presented to the provider by this writer, it is the recommendation of the on call psychiatric provider that inpatient hospitalization is the least restrictive environment for the patient at this time. The patient will be admitted to the inpatient unit. Voluntary form signed by pt and witnessed by this writer.      [address] [place] [address]  03/07/22 9832

## 2022-03-07 NOTE — ED NOTES
Patient brought back from triage. Patient oriented to [facility]. Patient changed into safety clothing and belongings locked into locker. Will continue to monitor patient.       Greta Dancer, RN  03/07/22 2571

## 2022-03-07 NOTE — ED PROVIDER NOTES
Evaluated by [id number] [facility] Provider          [facility] ED  EMERGENCY DEPARTMENT ENCOUNTER        Pt Name: Buddy Bynum  MRN: [id number]  [place] 1995  Dateof evaluation: 3/7/2022  Provider: JAKOB Loo CNP  PCP: JAKOB Stewart CNP  ED Attending: No att. providers found    [address] [place] [address]       Chief Complaint   Patient presents with    Psychiatric Evaluation     reports feeling suicidal x 1 day. plan to overdose on medication. denies alcohol and drugs       HISTORY OF PRESENTILLNESS   (Location/Symptom, Timing/Onset, Context/Setting, Quality, Duration, Modifying Factors, Severity)  Note limiting factors. Buddy Bynum is a 27 y.o. female for suicidal ideations. Onset was 1 day. Context includes patient reports that she has had suicidal thoughts for the past day. Patient states that she has a plan to overdose. Alleviating factors include nothing. Aggravating factors include nothing. Pain is 0/10. Nothing has been used for pain today. Nursing Notes were all reviewed and agreed with or any disagreements were addressed  in the HPI. REVIEW OF SYSTEMS    (2-9 systems for level 4, 10 or more for level 5)     Review of Systems   Constitutional: Negative for activity change, appetite change and fever. HENT: Negative for congestion, facial swelling, rhinorrhea and sore throat. Eyes: Negative for visual disturbance. Respiratory: Negative for shortness of breath. Cardiovascular: Negative for chest pain. Gastrointestinal: Negative for abdominal pain. Genitourinary: Negative for difficulty urinating. Musculoskeletal: Negative for arthralgias and myalgias. Skin: Negative for color change and rash. Neurological: Negative for dizziness and light-headedness. Psychiatric/Behavioral: Positive for self-injury and suicidal ideas. Negative for agitation. All other systems reviewed and are negative.       Positives and Pertinent negatives as per HPI. Except as noted above in the ROS, all other systems were reviewed and negative.        PAST MEDICAL HISTORY     Past Medical History:   Diagnosis Date    Anxiety     Depression 3/7/2022    Herpes simplex virus (HSV) infection     cold sores    Mild pre-eclampsia, postpartum 2018    had to go back to the hospital- with second pregancy     Postpartum depression     Psychiatric problem     Sleep apnea     Stress incontinence     Varicella          SURGICAL HISTORY       Past Surgical History:   Procedure Laterality Date    BLADDER SURGERY      when she was around 5 yo         CURRENT MEDICATIONS       Previous Medications    CITALOPRAM (CELEXA) 40 MG TABLET    Take 1 tablet by mouth daily    MOSHE FE 1/20 1-20 MG-MCG PER TABLET             ALLERGIES     Sulfa antibiotics and Pcn [penicillins]    FAMILY HISTORY       Family History   Problem Relation Age of Onset    High Blood Pressure Mother     High Cholesterol Mother     Asthma Mother     High Cholesterol Father     High Blood Pressure Maternal Grandmother     Other Maternal Grandmother         prediabetes    No Known Problems Paternal Grandmother     No Known Problems Paternal Grandfather     Breast Cancer Maternal Aunt           SOCIAL HISTORY       Social History     Socioeconomic History    Marital status: Single     Spouse name: None    Number of children: None    Years of education: None    Highest education level: None   Occupational History    None   Tobacco Use    Smoking status: Never Smoker    Smokeless tobacco: Never Used   Vaping Use    Vaping Use: Never used   Substance and Sexual Activity    Alcohol use: Yes     Comment: social    Drug use: Never    Sexual activity: Yes     Partners: Male     Birth control/protection: Implant     Comment: nexplanon   Other Topics Concern    None   Social History Narrative    None     Social Determinants of Health     Financial Resource Strain:     Difficulty of Paying Living Expenses: Not on file   Food Insecurity:     Worried About Running Out of Food in the Last Year: Not on file    Jigna of Food in the Last Year: Not on file   Transportation Needs:     Lack of Transportation (Medical): Not on file    Lack of Transportation (Non-Medical): Not on file   Physical Activity:     Days of Exercise per Week: Not on file    Minutes of Exercise per Session: Not on file   Stress:     Feeling of Stress : Not on file   Social Connections:     Frequency of Communication with Friends and Family: Not on file    Frequency of Social Gatherings with Friends and Family: Not on file    Attends Christianity Services: Not on file    Active Member of [address] [place] [address] [employer] or Organizations: Not on file    Attends Club or Organization Meetings: Not on file    Marital Status: Not on file   Intimate Partner Violence:     Fear of Current or Ex-Partner: Not on file    Emotionally Abused: Not on file    Physically Abused: Not on file    Sexually Abused: Not on file   Housing Stability:     Unable to Pay for Housing in the Last Year: Not on file    Number of [place] in the Last Year: Not on file    Unstable Housing in the Last Year: Not on file       SCREENINGS    [place] Coma Scale  Eye Opening: Spontaneous  Best Verbal Response: Oriented  Best Motor Response: Obeys commands  Janet Coma Scale Score: 15        PHYSICAL EXAM  (up to 7 for level 4, 8 or more for level 5)     ED Triage Vitals [03/07/22 1256]   BP Temp Temp Source Pulse Resp SpO2 Height Weight   (!) 166/95 98.9 °F (37.2 °C) Oral 102 16 99 % 5' 8\" (1.727 m) 265 lb (120.2 kg)       Physical Exam  Constitutional:       Appearance: She is well-developed. HENT:      Head: Normocephalic and atraumatic. Cardiovascular:      Rate and Rhythm: Tachycardia present. Pulmonary:      Effort: Pulmonary effort is normal. No respiratory distress. Abdominal:      General: There is no distension. Palpations: Abdomen is soft.    Musculoskeletal: General: Normal range of motion. Cervical back: Normal range of motion. Skin:     General: Skin is warm and dry. Neurological:      Mental Status: She is alert and oriented to person, place, and time. Psychiatric:         Attention and Perception: Attention normal.         Mood and Affect: Mood normal.         Speech: Speech normal.         Behavior: Behavior normal.         Thought Content: Thought content includes suicidal ideation. Thought content does not include homicidal ideation. Thought content includes suicidal plan. Thought content does not include homicidal plan. Cognition and Memory: Cognition is impaired.          DIAGNOSTIC RESULTS   LABS:    Labs Reviewed   CBC WITH AUTO DIFFERENTIAL - Abnormal; Notable for the following components:       Result Value    WBC 14.0 (*)     Neutrophils Absolute 11.3 (*)     All other components within normal limits   COMPREHENSIVE METABOLIC PANEL W/ REFLEX TO MG FOR LOW K - Abnormal; Notable for the following components:    Glucose 109 (*)     AST 13 (*)     All other components within normal limits   URINALYSIS WITH REFLEX TO CULTURE - Abnormal; Notable for the following components:    Clarity, UA CLOUDY (*)     Blood, Urine TRACE-INTACT (*)     Leukocyte Esterase, Urine SMALL (*)     All other components within normal limits   ACETAMINOPHEN LEVEL - Abnormal; Notable for the following components:    Acetaminophen Level <5 (*)     All other components within normal limits   SALICYLATE LEVEL - Abnormal; Notable for the following components:    Salicylate, Serum <0.2 (*)     All other components within normal limits   MICROSCOPIC URINALYSIS - Abnormal; Notable for the following components:    WBC, UA 10-20 (*)     Epithelial Cells, UA 6-10 (*)     Bacteria, UA 1+ (*)     All other components within normal limits   COVID-19 & INFLUENZA COMBO   CULTURE, URINE   PROCALCITONIN   HCG, SERUM, QUALITATIVE   URINE DRUG SCREEN   ETHANOL       All other labs werewithin normal range or not returned as of this dictation. EKG: All EKG's are interpreted by the Emergency Department Physician who either signs or Co-signs this chart in the absence of a cardiologist.  Please see their note for interpretation of EKG. RADIOLOGY:           Interpretation per the Radiologist below, if available at the time of this note:    No orders to display     No results found. PROCEDURES   Unless otherwise noted below, none     Procedures     CRITICAL CARE TIME   N/A    CONSULTS:  IP CONSULT TO HOSPITALIST      EMERGENCYDEPARTMENT COURSE and DIFFERENTIAL DIAGNOSIS/MDM:   Vitals:    Vitals:    03/07/22 1256 03/07/22 1636   BP: (!) 166/95 (!) 132/90   Pulse: 102 92   Resp: 16 16   Temp: 98.9 °F (37.2 °C) 97.8 °F (36.6 °C)   TempSrc: Oral Infrared   SpO2: 99% 98%   Weight: 265 lb (120.2 kg)    Height: 5' 8\" (1.727 m)        Patient was given the following medications:  Medications   nitrofurantoin (macrocrystal-monohydrate) (MACROBID) capsule 100 mg (has no administration in time range)       Patient was seen and evaluated by myself. Patient here for concerns for suicidal ideation. Patient reports that she has had suicidal thoughts for the last day. Patient reports that she does take Celexa. On exam she is awake and alert patient was found to be slightly tachycardic with a heart rate of 102. On reevaluation her heart rate is improved. Lab values have been reviewed and interpreted. Patient's urine was concerning for UTI. She was started on Macrobid in the ED. At this time the patient was considered medically cleared and has been consulted with behavioral health for an evaluation and assistance and final disposition. The patient tolerated their visit well. I have evaluated this patient. My supervising physician was available for consultation.  The patient and / or the family were informed of the results of any tests, a time was given to answer questions, a plan was proposed and they agreed with plan. FINAL IMPRESSION      1. Suicidal ideation    2. Urinary tract infection with hematuria, site unspecified          DISPOSITION/PLAN   DISPOSITION        PATIENT REFERRED TO:  No follow-up provider specified.     DISCHARGE MEDICATIONS:  New Prescriptions    No medications on file       DISCONTINUED MEDICATIONS:  Discontinued Medications    No medications on file              (Please note that portions of this note were completed with a voice recognition program.  Efforts were made to edit the dictations but occasionally words are mis-transcribed.)    JAKOB Park CNP (electronically signed)         JAKOB Park CNP  03/07/22 8181

## 2022-03-08 VITALS
HEIGHT: 68 IN | RESPIRATION RATE: 16 BRPM | HEART RATE: 78 BPM | DIASTOLIC BLOOD PRESSURE: 59 MMHG | SYSTOLIC BLOOD PRESSURE: 102 MMHG | TEMPERATURE: 97.1 F | BODY MASS INDEX: 40.16 KG/M2 | WEIGHT: 265 LBS | OXYGEN SATURATION: 98 %

## 2022-03-08 LAB — URINE CULTURE, ROUTINE: NORMAL

## 2022-03-08 PROCEDURE — 99236 HOSP IP/OBS SAME DATE HI 85: CPT | Performed by: PSYCHIATRY & NEUROLOGY

## 2022-03-08 PROCEDURE — 5130000000 HC BRIDGE APPOINTMENT

## 2022-03-08 RX ORDER — NITROFURANTOIN 25; 75 MG/1; MG/1
100 CAPSULE ORAL 2 TIMES DAILY
Status: DISCONTINUED | OUTPATIENT
Start: 2022-03-08 | End: 2022-03-08 | Stop reason: HOSPADM

## 2022-03-08 RX ORDER — NITROFURANTOIN 25; 75 MG/1; MG/1
100 CAPSULE ORAL 2 TIMES DAILY
Qty: 10 CAPSULE | Refills: 0 | Status: SHIPPED | OUTPATIENT
Start: 2022-03-08 | End: 2022-03-13

## 2022-03-08 RX ORDER — NITROFURANTOIN MACROCRYSTALS 50 MG/1
100 CAPSULE ORAL 2 TIMES DAILY
Status: DISCONTINUED | OUTPATIENT
Start: 2022-03-08 | End: 2022-03-08

## 2022-03-08 ASSESSMENT — PATIENT HEALTH QUESTIONNAIRE - PHQ9: SUM OF ALL RESPONSES TO PHQ QUESTIONS 1-9: 21

## 2022-03-08 ASSESSMENT — SLEEP AND FATIGUE QUESTIONNAIRES
DO YOU USE A SLEEP AID: NO
DO YOU HAVE DIFFICULTY SLEEPING: NO
AVERAGE NUMBER OF SLEEP HOURS: 6

## 2022-03-08 ASSESSMENT — LIFESTYLE VARIABLES: HISTORY_ALCOHOL_USE: NO

## 2022-03-08 NOTE — GROUP NOTE
Group Therapy Note    Date: 3/8/2022    Group Start Time: 1000  Group End Time: 2697  Group Topic: Cognitive Skills    [address] [facility]        Group Therapy Note    Attendees: 9      Group members engaged in a line-up communication exercise. The goal was to accurately demonstrate the power of communication. Notes:  [facility], [facility] attended group for majority of the time due to meeting with a member of her treatment team. [address] completed the exercise and communicated effectively with other members of the group. Status After Intervention:  Improved    Participation Level:  Active Listener and Interactive    Participation Quality: Appropriate, Attentive and Sharing      Speech:  normal      Thought Process/Content: Logical      Affective Functioning: Congruent      Mood: Brightened, Engaging     Level of consciousness:  Alert, Oriented x4 and Attentive      Response to Learning: Able to verbalize current knowledge/experience      Endings: None Reported    Modes of Intervention: Socialization, Exploration and Activity      Discipline Responsible: Behavorial Health Tech      Signature:  GEO Rogers

## 2022-03-08 NOTE — GROUP NOTE
Group Therapy Note    Date: 3/8/2022    Group Start Time: 1100  Group End Time: 2255  Group Topic: Psychoeducation    [facility]    GEO Mckeon        Group Therapy Note    Clinician prepared a group for strengthening relationships that the members requested in the morning meeting. Clinician went to every door to invite members to the group, turned off the tv, let everyone in the main room know it was group time. No one attended. Clinician came back out 10 minutes later and announced to everyone it was group time for a second time and no one attended. Attendees: 0         Patient's Goal:      Notes:  Patient was invited to group but chose not to attend.        Status After Intervention:  Unchanged    Participation Level: None    Participation Quality: Appropriate      Speech:  mute      Thought Process/Content: Linear      Affective Functioning: Blunted      Mood: depressed      Level of consciousness:  Alert      Response to Learning: Able to retain information      Endings: None Reported    Modes of Intervention: Education      Discipline Responsible: /Counselor      Signature:  GEO Mckeon

## 2022-03-08 NOTE — H&P
INITIAL PSYCHIATRIC HISTORY AND PHYSICAL      Patient name: Hattie Wick  Admit date: 3/7/2022  Today's date: 3/8/2022           CC:  Depression    HPI:   Patient seen in room on Adult Behavioral Unit. Patient is a 27 y.o. female who presents to [facility] voluntarily after she was brought in by her mother. Pt reports increased depression over the last few months. She and her  have recently been arguing more frequently and discussing separation. Yesterday they got into another argument and she believed they will separate for sure. She started researching on the internet how much Celexa she would need to overdose on to kill herself. She reached out to her mother before taking the pills and asked for help. She was started on Celexa a few months ago by her PCP but does not believe it is helping for her depression and anxiety. She reports recent difficulty with caring for her personal hygiene and also feels she does not spend as much time with her children. She currently lives with her mother and states she pushes the children off on her mother frequently. She does not want to get out of bed and spends much of her free time in bed or on the couch. She reports feelings of hopelessness and feeling as though she would be better off dead. This morning, Pt. Denied any SI. Pt. Describes that she is doing well today. She called her  last night and says that they were able to work out the miscommunication which was the center of their argument yesterday. Pt. Deirdre Messing to having self-regulation issues, and that in arguments she can see herself getting overly agitated but can't stop herself. Pt. Denies homicidal ideation. Pt.'s mood was described as fluctuating between happy and productive to depressed and labile. She says that these shift weekly for her. She will experience weeks where she has no trouble getting out of bed and then weeks where she feels she would be better off if she didn't wake up. PAST PSYCHIATRIC HISTORY:  Post-partum depression, Anxiety, Depression, DIANA, [facility] admission for depression. FAMILY PSYCHIATRIC HISTORY:     Family History   Problem Relation Age of Onset    High Blood Pressure Mother     High Cholesterol Mother     Asthma Mother     High Cholesterol Father     High Blood Pressure Maternal Grandmother     Other Maternal Grandmother         prediabetes    No Known Problems Paternal Grandmother     No Known Problems Paternal Grandfather     Breast Cancer Maternal Aunt        ALLERGIES:    Allergies   Allergen Reactions    Sulfa Antibiotics Other (See Comments)     Unsure of reaction    Pcn [Penicillins] Rash       CURRENT MEDICATIONS:     nitrofurantoin (macrocrystal-monohydrate)  100 mg Oral BID       PAST MEDICAL HISTORY:    Past Medical History:   Diagnosis Date    Anxiety     Depression 3/7/2022    Herpes simplex virus (HSV) infection     cold sores    Mild pre-eclampsia, postpartum 2018    had to go back to the hospital- with second pregancy     Postpartum depression     Psychiatric problem     Sleep apnea     Stress incontinence     Varicella         PAST SURGICAL HISTORY:    Past Surgical History:   Procedure Laterality Date    BLADDER SURGERY      when she was around 5 yo       PROBLEM LIST:  Principal Problem:    MDD (major depressive disorder), recurrent episode ([facility]  Active Problems:    Obesity, morbid, BMI 40.0-49.9 ([facility]    Suicidal ideation  Resolved Problems:    * No resolved hospital problems. *       SOCIAL HISTORY:  Social History     Socioeconomic History    Marital status: Single     Spouse name: Not on file    Number of children: Not on file    Years of education: Not on file    Highest education level: Not on file   Occupational History    Not on file   Tobacco Use    Smoking status: Never Smoker    Smokeless tobacco: Never Used   Vaping Use    Vaping Use: Never used   Substance and Sexual Activity    Alcohol use:  Yes Comment: social    Drug use: Never    Sexual activity: Yes     Partners: Male     Birth control/protection: Implant     Comment: nexplanon   Other Topics Concern    Not on file   Social History Narrative    Not on file     Social Determinants of Health     Financial Resource Strain:     Difficulty of Paying Living Expenses: Not on file   Food Insecurity:     Worried About Running Out of Food in the Last Year: Not on file    Jigna of Food in the Last Year: Not on file   Transportation Needs:     Lack of Transportation (Medical): Not on file    Lack of Transportation (Non-Medical): Not on file   Physical Activity:     Days of Exercise per Week: Not on file    Minutes of Exercise per Session: Not on file   Stress:     Feeling of Stress : Not on file   Social Connections:     Frequency of Communication with Friends and Family: Not on file    Frequency of Social Gatherings with Friends and Family: Not on file    Attends Catholic Services: Not on file    Active Member of [address] [place] [address] or Organizations: Not on file    Attends Club or Organization Meetings: Not on file    Marital Status: Not on file   Intimate Partner Violence:     Fear of Current or Ex-Partner: Not on file    Emotionally Abused: Not on file    Physically Abused: Not on file    Sexually Abused: Not on file   Housing Stability:     Unable to Pay for Housing in the Last Year: Not on file    Number of [place] in the Last Year: Not on file    Unstable Housing in the Last Year: Not on file       OBJECTIVE:   Vitals:    03/08/22 0755   BP: (!) 102/59   Pulse: 78   Resp: 16   Temp: 97.1 °F (36.2 °C)   SpO2: 98%       REVIEW OF SYSTEMS:   Reports no current cardiovascular, respiratory, gastrointestinal, genitourinary,   integumentary, neurological, musculoskeletal, or immunological symptoms today.   PSYCHIATRIC:  See HPI above     PSYCHIATRIC EXAMINATION / MENTAL STATUS EXAM:     CONSTITUTIONAL:    Vitals:    Blood pressure (!) 102/59, pulse 78, temperature 97.1 °F (36.2 °C), temperature source Temporal, resp. rate 16, height 5' 8\" (1.727 m), weight 265 lb (120.2 kg), SpO2 98 %, not currently breastfeeding.   General appearance:  [x]  appears age, []  appears older than stated age,     [x]  adequately dressed and groomed, [] disheveled,                [x]  in no acute distress, [] appears mildly distressed,      []  Other:      MUSCULOSKELETAL:   Gait:   [x] normal, [] antalgic, [] unsteady, [] in bed, gait not evaluated   Station:  [] erect, [x] sitting, [] recumbent, [] other        Strength/tone:  [x] muscle strength and tone appear consistent with age and condition     [] atrophy      [] abnormal movements  PSYCHIATRIC:    Relatedness:  [x] cooperative, [] guarded, [] indifferent, [] hostile,      [] sedated  Speech:  [] normal prosody, [] pressured, [] decreased volume,    [] slurred, [] halting, [] slowed, [] other     [] echolalia, [] incoherent, [] stuttering   Eye contact:  [x] direct, [] avoidant, [] intense  Kinetics:  [x] normal, [] increased, [] decreased  Mood:   [] stable, [x] depressed, [] anxious, [] irritable,     [x] labile  Affect:   [] normal range, [] constricted, [x] depressed, [] anxious,     [] angry, [] blunted  Hallucinations  [x] denies, [] auditory,  [] visual,  [] olfactory, [] tactile  Delusions  [x] none, [] grandiose,  [] jealous,  [] persecutory,  [] somatic,     [] bizarre  Preoccupations  [x] none, [] violence, [] obsessions, [] other     Suicidal ideation  [] denies, [x] endorses  Homicidal ideation [x] denies, [] endorses  Thought process: [x] logical, [] circumstantial, [] tangential, [] FRIEDA,     [] simplistic, [] disorganized  Associations:  [x] logical and coherent, [] loosening, [] disorganized  Insight:   [] good, [x] fair, [] poor  Judgment:  [] good, [x] fair, [] poor  Attention and concentration:     [x] intact, [] limited, [] able to focus on interview,     [] grossly impaired  Orientation:  [x] person, place, time, situation     [] disoriented to:     Memory:  Remote memory [x] intact, [] impaired     Recent memory  [x] intact, [] impaired          IMPRESSION    Principal Problem:    MDD (major depressive disorder), [facility]  Active Problems:    Obesity, morbid, BMI 40.0-49.9 ([address]    Suicidal ideation  Resolved Problems:    * No resolved hospital problems. *       ______  Dx: axis I: MDD (major depressive disorder), recurrent episode ([facility]   Axis 2: No diagnosis   Justina 3: See Medical History  Patient Active Problem List    Diagnosis Date Noted    Suicidal ideation     Depression 03/07/2022    MDD (major depressive disorder), recurrent episode ([facility] 03/07/2022    Mild obstructive sleep apnea 12/12/2019    Obesity, morbid, BMI 40.0-49.9 ([facility] 12/12/2019    And Present on Admission:   MDD (major depressive disorder), recurrent episode ([facility]   Obesity, morbid, BMI 40.0-49.9 ([facility]   Suicidal ideation    Axis 4: Problems with primary support group and Economic problems    Axis 5: 11-20 some danger of hurting self or others possible OR occasionally fails to maintain minimal personal hygiene OR gross impairment in communication   All conditions on Axis 1 and Axis 2 and active Axis 3 problems are being treated while patient is hospitalized. Active Hospital Problems    Diagnosis Date Noted    Suicidal ideation [R45.851]     MDD (major depressive disorder), recurrent episode ([facility] [F33.9] 03/07/2022    Obesity, morbid, BMI 40.0-49.9 ([facility] [E66.01] 12/12/2019     Tx plan:    prevent self injury, stabilize affect, restore sleep, treat depression, treat anxiety, establish/maintain aftercare, increase coping mechanisms, improve medication compliance. All conditions present on admission are being treated while pt is hospitalized. Discussed PHP after discharge as part of transition back to the community.      Medications  Current Facility-Administered Medications   Medication Dose Route Frequency Provider Last Rate Last Admin    nitrofurantoin (macrocrystal-monohydrate) (MACROBID) capsule 100 mg  100 mg Oral BID Gisselle Garibay MD        acetaminophen (TYLENOL) tablet 650 mg  650 mg Oral Q4H PRN Vida MD Merlin   650 mg at 03/07/22 2059    hydrOXYzine (ATARAX) tablet 50 mg  50 mg Oral TID PRN Vidaclaudia Boyer MD        traZODone (DESYREL) tablet 50 mg  50 mg Oral Nightly PRN Vida MD Merlin   50 mg at 03/07/22 2123    nicotine polacrilex (COMMIT) lozenge 2 mg  2 mg Oral Q1H PRN Vida MD Merlin         Current Outpatient Medications   Medication Sig Dispense Refill    nitrofurantoin, macrocrystal-monohydrate, (MACROBID) 100 MG capsule Take 1 capsule by mouth 2 times daily for 5 days 10 capsule 0    citalopram (CELEXA) 40 MG tablet Take 1 tablet by mouth daily 90 tablet 2    MOSHE FE 1/20 1-20 MG-MCG per tablet         nitrofurantoin (macrocrystal-monohydrate)  100 mg Oral BID      PRN Meds: acetaminophen, hydrOXYzine, traZODone, nicotine polacrilex   Estimated length of stay: 1 days   Prognosis:  Good. Criteria for Discharge:   Not suicidal, sleeping well, affect stable, depression improving, eating well, aftercare arranged. Spent > 70 minutes evaluating and treating patient more than 50% of such was direct patient care.     ______  PLAN    1. Admit to Adult Behavioral Unit / Senior Behavioral Unit  2. Consult Internal Medicine to evaluate and treat medical conditions  3. Adjust psychotropic medications to target symptoms  4. Occupational Therapy, Physical Therapy, Group Psychotherapy as tolerated   5. Reviewed treatment plan with patient including medication risks, benefits, side effects. Obtained informed consent for treatment. Addendum to PA student note:  Pt seen, examined, and evaluated with PA student, Mana Junior , who acted as my scribe for the above documentation.  I have reviewed the current history, physical findings, labs, assessment and plan; and agree with note as documented.     Laura Jasso MD  Physician Psychiatry

## 2022-03-08 NOTE — CARE COORDINATION
[facility]  Treatment Team Note  Day 1    Review Date & Time: 0900  3/8/2022    Patient was not in treatment team      Status EXAM:   Status and Exam  Normal: No  Facial Expression: Worried  Affect: Appropriate  Level of Consciousness: Alert  Mood:Normal: No  Mood: Sad  Motor Activity:Normal: Yes  Interview Behavior: Cooperative  Preception: [place] [employer] to Time,[place] to Place,[place] to Situation  Attention:Normal: Yes  Thought Content:Normal: Yes  Hallucinations: None  Delusions: No  Memory:Normal: Yes  Insight and Judgment: Yes  Present Suicidal Ideation: No  Present Homicidal Ideation: No      Suicide Risk CSSR-S:  1) Within the past month, have you wished you were dead or wished you could go to sleep and not wake up? : Yes  2) Have you actually had any thoughts of killing yourself? : Yes  3) Have you been thinking about how you might kill yourself? : Yes  5) Have you started to work out or worked out the details of how to kill yourself? Do you intend to carry out this plan? : No  6) Have you ever done anything, started to do anything, or prepared to do anything to end your life?: No      PLAN/TREATMENT RECOMMENDATIONS UPDATE: Patient will take medication as prescribed, eat 75% of meals, attend groups, participate in milieu activities, participate in treatment team and care planning for discharge and follow up. Patient is going to be discharged today per order.     Diana Rene RN

## 2022-03-08 NOTE — BH NOTE
..   [facility]  Admission Note     Admission Type:   Admission Type: Voluntary    Reason for admission:       PATIENT STRENGTHS:  Strengths: Communication,Employment,Medication Compliance,Social Skills    Patient Strengths and Limitations:       Addictive Behavior:   Addictive Behavior  In the past 3 months, have you felt or has someone told you that you have a problem with:  : Eating (too much/too little)  Do you have a history of Chemical Use?: No  Do you have a history of Alcohol Use?: Yes (infrequently & not currently)  Do you have a history of Street Drug Abuse?: No  Histroy of Prescripton Drug Abuse?: No    Medical Problems:   Past Medical History:   Diagnosis Date    Anxiety     Depression 3/7/2022    Herpes simplex virus (HSV) infection     cold sores    Mild pre-eclampsia, postpartum 2018    had to go back to the hospital- with second pregancy     Postpartum depression     Psychiatric problem     Sleep apnea     Stress incontinence     Varicella        Status EXAM:  Status and Exam  Normal: No  Facial Expression: Sad,Worried  Affect: Blunt  Level of Consciousness: Alert  Mood:Normal: No  Mood: Depressed,Anxious  Motor Activity:Normal: Yes  Interview Behavior: Cooperative  Preception: [place] to Person,[place] to Time,[place] to Place,[place] to Situation  Attention:Normal: Yes  Thought Content:Normal: Yes  Hallucinations: None  Delusions: No  Memory:Normal: Yes  Insight and Judgment: No  Present Suicidal Ideation: Yes  Present Homicidal Ideation: No      Metabolic Screening:    Lab Results   Component Value Date    LABA1C 5.9 10/25/2021       Lab Results   Component Value Date    CHOL 168 10/25/2021    CHOL 173 10/06/2020    CHOL 161 12/16/2019     Lab Results   Component Value Date    TRIG 207 (H) 10/25/2021    TRIG 230 (H) 10/06/2020    TRIG 140 12/16/2019     Lab Results   Component Value Date    HDL 34 (L) 10/25/2021    HDL 30 (L) 10/06/2020    HDL 34 (L) 12/16/2019     No components found for: [facility]  Lab Results   Component Value Date    LABVLDL 41 10/25/2021    LABVLDL 46 10/06/2020    LABVLDL 28 12/16/2019         Body mass index is 40.29 kg/m². BP Readings from Last 2 Encounters:   03/07/22 (!) 140/93   11/18/21 116/82           Pt admitted with followings belongings:  Dental Appliances: None  Vision - Corrective Lenses: Glasses  Hearing Aid: None  Jewelry: Ring (Ring given to pts mom, Micah Dear.)  Clothing: Louetta Section / coat  Other Valuables:  (no phone)     Patient's home medications were not brought with her. .  Patient oriented to surroundings and program expectations and copy of patient rights given. Received admission packet:  . Consents reviewed, signed . Sandy Nance Patient verbalize understanding:  Patient education on precautions.                    Jw Allen LPN

## 2022-03-08 NOTE — FLOWSHEET NOTE
Purposeful Rounding    Patient Location: Patient room    Patient willing to engage in conversation: Yes    Presentation/behavior: Cooperative and Pleasant    Affect: Neutral/Euthymic(normal)    Concerns reported: none    PRN medications given: n/a    Environmental assessment: Room free from clutter, Clear path to bathroom  and No safety hazards noted    Fall prevention interventions in place: Yellow non-skid socks on    Daily [place]arro Fall Risk Score:79    Daily [place] Fall Risk Score:0-low      Electronically signed by Felix Suarez RN on 3/8/22 at 8:25 AM EST

## 2022-03-08 NOTE — PROGRESS NOTES
Behavioral Services  Medicare Certification Upon Admission    I certify that this patient's inpatient psychiatric hospital admission is medically necessary for:    [x] (1) Treatment which could reasonably be expected to improve this patient's condition,       [x] (2) Or for diagnostic study;     AND     [x](2) The inpatient psychiatric services are provided while the individual is under the care of a physician and are included in the individualized plan of care.     Estimated length of stay/service 3-5 d    Plan for post-hospital careoutpt    Electronically signed by Jessie Adhikari MD on 3/8/2022 at 10:04 AM

## 2022-03-08 NOTE — PLAN OF CARE
[facility]  Discharge Note    Pt discharged with followings belongings:   Dental Appliances: None  Vision - Corrective Lenses: Glasses  Hearing Aid: None  Jewelry: Ring  Body Piercings Removed: N/A  Clothing: Thamas Sang / coat  Were All Patient Medications Collected?: Not Applicable  Other Valuables: Other (Comment) (per ROSSANA Diaz pt has no phone)   Valuables sent home with patient. Patient education on aftercare instructions: yes Information faxed to by Charge RN  at 12:15 PM .Patient verbalize understanding of AVS:  yes    Status EXAM upon discharge:  Status and Exam  Normal: No  Facial Expression: Worried  Affect: Appropriate  Level of Consciousness: Alert  Mood:Normal: No  Mood: Sad  Motor Activity:Normal: Yes  Interview Behavior: Cooperative  Preception: [place] to Person,[place] to Time,[place] to Place,[place] to Situation  Attention:Normal: Yes  Thought Content:Normal: Yes  Hallucinations: None  Delusions: No  Memory:Normal: Yes  Insight and Judgment: Yes  Present Suicidal Ideation: No  Present Homicidal Ideation: No      Metabolic Screening:    Lab Results   Component Value Date    LABA1C 5.9 10/25/2021       Lab Results   Component Value Date    CHOL 168 10/25/2021    CHOL 173 10/06/2020    CHOL 161 12/16/2019     Lab Results   Component Value Date    TRIG 207 (H) 10/25/2021    TRIG 230 (H) 10/06/2020    TRIG 140 12/16/2019     Lab Results   Component Value Date    HDL 34 (L) 10/25/2021    HDL 30 (L) 10/06/2020    HDL 34 (L) 12/16/2019     No components found for: [facility] [place]  Lab Results   Component Value Date    LABVLDL 41 10/25/2021    LABVLDL 46 10/06/2020    LABVLDL 28 12/16/2019       Fernanda Weaver RN     Bridge Appointment completed: Reviewed Discharge Instructions with patient. Patient verbalizes understanding and agreement with the discharge plan using the teachback method.      Referral for Outpatient Tobacco Cessation Counseling, upon discharge (donald X if applicable and completed):    ( )  Hospital staff assisted patient to call Quit Line or faxed referral                                   during hospitalization                  ( )  Recognizing danger situations (included triggers and roadblocks), if not completed on admission                    ( )  Coping skills (new ways to manage stress, exercise, relaxation techniques, changing routine, distraction), if not completed on admission                                                           ( )  Basic information about quitting (benefits of quitting, techniques in how to quit, available resources, if not completed on admission  ( ) Referral for counseling faxed to [facility]   ( ) Patient refused referral  ( ) Patient refused counseling  (x ) Patient refused smoking cessation medication upon discharge    Vaccinations (donald X if applicable and completed):  ( ) Patient states already received influenza vaccine elsewhere  ( ) Patient received influenza vaccine during this hospitalization  (x ) Patient refused influenza vaccine at this time
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
## 2022-03-08 NOTE — BH NOTE
Pt given Trazodone 50 mg po at 2123 for sleep. Pt got only fair results. She slept a total of 5 hours.

## 2022-03-11 ENCOUNTER — TELEPHONE (OUTPATIENT)
Dept: PSYCHOLOGY | Age: 27
End: 2022-03-11

## 2022-03-11 ENCOUNTER — OFFICE VISIT (OUTPATIENT)
Dept: PSYCHOLOGY | Age: 27
End: 2022-03-11
Payer: COMMERCIAL

## 2022-03-11 DIAGNOSIS — F32.1 CURRENT MODERATE EPISODE OF MAJOR DEPRESSIVE DISORDER, UNSPECIFIED WHETHER RECURRENT (HCC): ICD-10-CM

## 2022-03-11 DIAGNOSIS — F41.1 GAD (GENERALIZED ANXIETY DISORDER): Primary | ICD-10-CM

## 2022-03-11 PROCEDURE — 1036F TOBACCO NON-USER: CPT | Performed by: PSYCHOLOGIST

## 2022-03-11 PROCEDURE — 90832 PSYTX W PT 30 MINUTES: CPT | Performed by: PSYCHOLOGIST

## 2022-03-11 ASSESSMENT — PATIENT HEALTH QUESTIONNAIRE - PHQ9
SUM OF ALL RESPONSES TO PHQ QUESTIONS 1-9: 13
6. FEELING BAD ABOUT YOURSELF - OR THAT YOU ARE A FAILURE OR HAVE LET YOURSELF OR YOUR FAMILY DOWN: 1
5. POOR APPETITE OR OVEREATING: 1
SUM OF ALL RESPONSES TO PHQ QUESTIONS 1-9: 12
2. FEELING DOWN, DEPRESSED OR HOPELESS: 2
1. LITTLE INTEREST OR PLEASURE IN DOING THINGS: 1
7. TROUBLE CONCENTRATING ON THINGS, SUCH AS READING THE NEWSPAPER OR WATCHING TELEVISION: 1
SUM OF ALL RESPONSES TO PHQ QUESTIONS 1-9: 13
SUM OF ALL RESPONSES TO PHQ9 QUESTIONS 1 & 2: 3
4. FEELING TIRED OR HAVING LITTLE ENERGY: 2
3. TROUBLE FALLING OR STAYING ASLEEP: 2
8. MOVING OR SPEAKING SO SLOWLY THAT OTHER PEOPLE COULD HAVE NOTICED. OR THE OPPOSITE, BEING SO FIGETY OR RESTLESS THAT YOU HAVE BEEN MOVING AROUND A LOT MORE THAN USUAL: 2
SUM OF ALL RESPONSES TO PHQ QUESTIONS 1-9: 13
9. THOUGHTS THAT YOU WOULD BE BETTER OFF DEAD, OR OF HURTING YOURSELF: 1

## 2022-03-11 ASSESSMENT — ANXIETY QUESTIONNAIRES
3. WORRYING TOO MUCH ABOUT DIFFERENT THINGS: 2
4. TROUBLE RELAXING: 1
7. FEELING AFRAID AS IF SOMETHING AWFUL MIGHT HAPPEN: 1
1. FEELING NERVOUS, ANXIOUS, OR ON EDGE: 2
GAD7 TOTAL SCORE: 10
2. NOT BEING ABLE TO STOP OR CONTROL WORRYING: 1
6. BECOMING EASILY ANNOYED OR IRRITABLE: 2
5. BEING SO RESTLESS THAT IT IS HARD TO SIT STILL: 1

## 2022-03-11 NOTE — TELEPHONE ENCOUNTER
Hello, Pt would like a consult to discuss her anxiety and depression medications. Pt reported that she is not experiencing benefits for depression symptoms on current medication. Please advise.

## 2022-03-11 NOTE — PATIENT INSTRUCTIONS
1. Consider integrating some behavioral activations activities to improve mood and reduce stress. Focus on improving sleep, appetite, movement, engagement in your hobbies & social connectedness. 2. Return to see Clara Naidu in 2 weeks. Behavioral activation strategies  Movement: Take a 15 minute walk daily during your lunch break. Consider integrating music. Try to take 5 photos of things you like on your walk. Social: Plan to spend an hour playing with your kids (coloring, puzzles, going outside) on Saturday. Sleep: Practice getting out of bed once you wake up. Try to get a glass of water and see how you're feeling.

## 2022-03-11 NOTE — PROGRESS NOTES
Behavioral Health Consultation  Wil Paz M.A. Psychology Assistant  Oly Garcia, Ph.D. Supervising Psychologist  3/11/2022  2:11 PM EST      Time spent with Patient: 35 minutes  This is patient's fifth  [facility] appointment. Reason for Consult:    Chief Complaint   Patient presents with    Anxiety    Depression     Referring Provider: Willy Javier, APRN - CNP  [address],  [address] [facility], [place], [facility]    Pt provided informed consent for the behavioral health program. Discussed with patient model of service to include the limits of confidentiality (i.e. abuse reporting, suicide intervention, etc.) and short-term intervention focused approach. Reviewed provision of services under supervision of licensed psychologist and obtained written consent. Pt indicated understanding. Feedback given to PCP. S:  Pt reported that she was admitted to Psychiatry at [facility] due to suicidal ideation. Pt reported that she experienced a surge in her anxiety and depression symptoms after an argument with her . Pt reported the argument was as a result of her not taking care of herself, her children, or her marriage. Pt reported that her  left the home and that they continued to argue over the phone. Pt reported that her  had expressed not wanting to be near her or see her. Pt reported that her  was threatening to leave the marriage and that she began to experience suicidal thoughts. Pt reported that she has been doing good since getting discharged and that she is not currently fighting with her . Pt reported that she had a talk with her  and that they discussed ways she could prioritize their marriage, and improve her life moving forward. Pt expressed desire to get better, and be more involved in her marriage and with her children. Pt reported feeling supported by  but reported feeling like she doesn't help or support her .  Pt also expressed a desire change her medication as she is not experiencing benefits for depression. Pt reported that her goal moving forward is to manage her depression so that she can better take care of herself, her , and her children. Pt would like to improve her communication skills with her , help him more with his goals, and engage in more home improvements tasks. Discussed and collaboratively set behavioral activation goals with Pt. Pt reported feeling like the identified goals were attainable. O:  MSE:    Appearance: good hygiene   Attitude: cooperative and friendly  Consciousness: alert  Orientation: oriented to person, place, time, general circumstance  Memory: recent and remote memory intact  Attention/Concentration: intact during session  Psychomotor Activity:normal  Eye Contact: normal  Speech: normal rate and volume, well-articulated  Mood: low  Affect: dysphoric  Perception: within normal limits  Thought Content: intrusive thoughts  Thought Process: logical, coherent and goal-directed  Insight: good  Judgment: intact  Ability to understand instructions: Yes  Ability to respond meaningfully: Yes  Morbid Ideation: no   Suicide Assessment: no suicidal ideation, plan, or intent  Homicidal Ideation: no    History:    Medications:   Current Outpatient Medications   Medication Sig Dispense Refill    nitrofurantoin, macrocrystal-monohydrate, (MACROBID) 100 MG capsule Take 1 capsule by mouth 2 times daily for 5 days 10 capsule 0    citalopram (CELEXA) 40 MG tablet Take 1 tablet by mouth daily 90 tablet 2    MOSHE FE 1/20 1-20 MG-MCG per tablet        No current facility-administered medications for this visit.      Social History:   Social History     Socioeconomic History    Marital status: Single     Spouse name: Not on file    Number of children: Not on file    Years of education: Not on file    Highest education level: Not on file   Occupational History    Not on file   Tobacco Use    Smoking status: Never Smoker    Smokeless tobacco: Never Used   Vaping Use    Vaping Use: Never used   Substance and Sexual Activity    Alcohol use: Yes     Comment: social    Drug use: Never    Sexual activity: Yes     Partners: Male     Birth control/protection: Implant     Comment: nexplanon   Other Topics Concern    Not on file   Social History Narrative    Not on file     Social Determinants of Health     Financial Resource Strain:     Difficulty of Paying Living Expenses: Not on file   Food Insecurity:     Worried About Running Out of Food in the Last Year: Not on file    Jigna of Food in the Last Year: Not on file   Transportation Needs:     Lack of Transportation (Medical): Not on file    Lack of Transportation (Non-Medical): Not on file   Physical Activity:     Days of Exercise per Week: Not on file    Minutes of Exercise per Session: Not on file   Stress:     Feeling of Stress : Not on file   Social Connections:     Frequency of Communication with Friends and Family: Not on file    Frequency of Social Gatherings with Friends and Family: Not on file    Attends Yazdanism Services: Not on file    Active Member of [address] [place] [address] or Organizations: Not on file    Attends Club or Organization Meetings: Not on file    Marital Status: Not on file   Intimate Partner Violence:     Fear of Current or Ex-Partner: Not on file    Emotionally Abused: Not on file    Physically Abused: Not on file    Sexually Abused: Not on file   Housing Stability:     Unable to Pay for Housing in the Last Year: Not on file    Number of [place] in the Last Year: Not on file    Unstable Housing in the Last Year: Not on file     TOBACCO:   reports that she has never smoked. She has never used smokeless tobacco.  ETOH:   reports current alcohol use.   Family History:   Family History   Problem Relation Age of Onset    High Blood Pressure Mother     High Cholesterol Mother     Asthma Mother     High Cholesterol Father     High Blood Pressure Maternal Grandmother     Other Maternal Grandmother         prediabetes    No Known Problems Paternal Grandmother     No Known Problems Paternal Grandfather     Breast Cancer Maternal Aunt        A:  Administered PHQ-9 and ANDRÉS-7 (see below). Patient endorses moderate symptoms of depression and moderate symptoms of anxiety. Pt reported experiencing suicidal ideation earlier in the week but denies current ideation. PHQ Scores 3/11/2022 2/25/2022 10/25/2021 10/6/2020 11/4/2019   PHQ2 Score 3 3 2 0 0   PHQ9 Score 13 14 10 0 0     Interpretation of Total Score Depression Severity: 1-4 = Minimal depression, 5-9 = Mild depression, 10-14 = Moderate depression, 15-19 = Moderately severe depression, 20-27 = Severe depression     ANDRÉS 7 SCORE 3/11/2022 2/25/2022   ANDRÉS-7 Total Score 10 9     Interpretation of ANDRÉS-7 score: 5-9 = mild anxiety, 10-14 = moderate anxiety, 15+ = severe anxiety. Recommend referral to behavioral health for scores 10 or greater. Diagnosis:    1. ANDRÉS (generalized anxiety disorder)    2. Current moderate episode of major depressive disorder, unspecified whether recurrent ([facility]          Diagnosis Date    Anxiety     Depression 3/7/2022    Herpes simplex virus (HSV) infection     cold sores    Mild pre-eclampsia, postpartum 2018    had to go back to the hospital- with second pregancy     Postpartum depression     Psychiatric problem     Sleep apnea     Stress incontinence     Varicella        Plan:  Pt interventions:  Established rapport, Conducted functional assessment, Lynn-setting to identify pt's primary goals for [facility] visit / overall health, Supportive techniques and Problem-solving re: behavioral activation. Collaboratively set behavioral activation goals.     Pt Behavioral Change Plan:   See Pt Instructions

## 2022-03-29 ENCOUNTER — OFFICE VISIT (OUTPATIENT)
Dept: FAMILY MEDICINE CLINIC | Age: 27
End: 2022-03-29
Payer: COMMERCIAL

## 2022-03-29 VITALS
BODY MASS INDEX: 41.97 KG/M2 | SYSTOLIC BLOOD PRESSURE: 126 MMHG | OXYGEN SATURATION: 98 % | WEIGHT: 276 LBS | HEART RATE: 78 BPM | DIASTOLIC BLOOD PRESSURE: 80 MMHG

## 2022-03-29 DIAGNOSIS — Z13.31 POSITIVE DEPRESSION SCREENING: ICD-10-CM

## 2022-03-29 DIAGNOSIS — F41.9 ANXIETY AND DEPRESSION: Primary | ICD-10-CM

## 2022-03-29 DIAGNOSIS — F32.A ANXIETY AND DEPRESSION: Primary | ICD-10-CM

## 2022-03-29 PROCEDURE — G8427 DOCREV CUR MEDS BY ELIG CLIN: HCPCS | Performed by: NURSE PRACTITIONER

## 2022-03-29 PROCEDURE — G8417 CALC BMI ABV UP PARAM F/U: HCPCS | Performed by: NURSE PRACTITIONER

## 2022-03-29 PROCEDURE — G8482 FLU IMMUNIZE ORDER/ADMIN: HCPCS | Performed by: NURSE PRACTITIONER

## 2022-03-29 PROCEDURE — 1036F TOBACCO NON-USER: CPT | Performed by: NURSE PRACTITIONER

## 2022-03-29 PROCEDURE — 99213 OFFICE O/P EST LOW 20 MIN: CPT | Performed by: NURSE PRACTITIONER

## 2022-03-29 PROCEDURE — 1111F DSCHRG MED/CURRENT MED MERGE: CPT | Performed by: NURSE PRACTITIONER

## 2022-03-29 RX ORDER — BUPROPION HYDROCHLORIDE 150 MG/1
150 TABLET ORAL EVERY MORNING
Qty: 30 TABLET | Refills: 3 | Status: SHIPPED | OUTPATIENT
Start: 2022-03-29

## 2022-03-29 ASSESSMENT — PATIENT HEALTH QUESTIONNAIRE - PHQ9
SUM OF ALL RESPONSES TO PHQ9 QUESTIONS 1 & 2: 4
SUM OF ALL RESPONSES TO PHQ QUESTIONS 1-9: 18
SUM OF ALL RESPONSES TO PHQ QUESTIONS 1-9: 17
10. IF YOU CHECKED OFF ANY PROBLEMS, HOW DIFFICULT HAVE THESE PROBLEMS MADE IT FOR YOU TO DO YOUR WORK, TAKE CARE OF THINGS AT HOME, OR GET ALONG WITH OTHER PEOPLE: 2
SUM OF ALL RESPONSES TO PHQ QUESTIONS 1-9: 18
9. THOUGHTS THAT YOU WOULD BE BETTER OFF DEAD, OR OF HURTING YOURSELF: 1
3. TROUBLE FALLING OR STAYING ASLEEP: 3
SUM OF ALL RESPONSES TO PHQ QUESTIONS 1-9: 18
7. TROUBLE CONCENTRATING ON THINGS, SUCH AS READING THE NEWSPAPER OR WATCHING TELEVISION: 1
8. MOVING OR SPEAKING SO SLOWLY THAT OTHER PEOPLE COULD HAVE NOTICED. OR THE OPPOSITE, BEING SO FIGETY OR RESTLESS THAT YOU HAVE BEEN MOVING AROUND A LOT MORE THAN USUAL: 1
4. FEELING TIRED OR HAVING LITTLE ENERGY: 3
5. POOR APPETITE OR OVEREATING: 3
2. FEELING DOWN, DEPRESSED OR HOPELESS: 2
1. LITTLE INTEREST OR PLEASURE IN DOING THINGS: 2
6. FEELING BAD ABOUT YOURSELF - OR THAT YOU ARE A FAILURE OR HAVE LET YOURSELF OR YOUR FAMILY DOWN: 2

## 2022-03-29 ASSESSMENT — ENCOUNTER SYMPTOMS
WHEEZING: 0
SHORTNESS OF BREATH: 0

## 2022-03-29 NOTE — PROGRESS NOTES
Patient: Marjorie Carrion is a 27 y.o. female who presents today with the following Chief Complaint(s):  Chief Complaint   Patient presents with    Depression     medication did not help    Anxiety         HPI   Depression: does not feel like celexa is helping her depression- feels like it has helped her anxiety. States she feels like she possibly has bipolar- some weeks feels really down and other weeks she is feeling great. Sometimes sleeps too much. Was in the hospital 3/7/2022 for SI. She let her mom know and was taken to [place]. Denies any SI at this time. Current Outpatient Medications   Medication Sig Dispense Refill    buPROPion (WELLBUTRIN XL) 150 MG extended release tablet Take 1 tablet by mouth every morning 30 tablet 3    citalopram (CELEXA) 40 MG tablet Take 1 tablet by mouth daily 90 tablet 2    MOSHE FE 1/20 1-20 MG-MCG per tablet        No current facility-administered medications for this visit. Patient's past medical history, surgical history, family history, medications,  andallergies  were all reviewed and updated as appropriate today. Review of Systems   Constitutional: Negative for chills and fever. Respiratory: Negative for shortness of breath and wheezing. Cardiovascular: Negative for chest pain and palpitations. Neurological: Negative for dizziness and headaches. Psychiatric/Behavioral: Positive for dysphoric mood and sleep disturbance (sleeping too much or stays up too late). Negative for suicidal ideas. The patient is nervous/anxious. Physical Exam  Vitals and nursing note reviewed. Constitutional:       Appearance: Normal appearance. She is well-developed. She is obese. HENT:      Head: Normocephalic and atraumatic. Right Ear: External ear normal.      Left Ear: External ear normal.      Nose: Nose normal.      Mouth/Throat:      Pharynx: No oropharyngeal exudate or posterior oropharyngeal erythema.    Eyes:      Conjunctiva/sclera: Conjunctivae normal.   Cardiovascular:      Rate and Rhythm: Normal rate and regular rhythm. Heart sounds: Normal heart sounds. No murmur heard. Pulmonary:      Effort: Pulmonary effort is normal. No respiratory distress. Breath sounds: Normal breath sounds. No wheezing or rales. Musculoskeletal:         General: Normal range of motion. Cervical back: Normal range of motion and neck supple. Lymphadenopathy:      Cervical: No cervical adenopathy. Skin:     General: Skin is warm and dry. Neurological:      General: No focal deficit present. Mental Status: She is alert and oriented to person, place, and time. Deep Tendon Reflexes: Reflexes are normal and symmetric. Psychiatric:         Mood and Affect: Mood normal.         Behavior: Behavior normal.         Thought Content: Thought content normal.         Judgment: Judgment normal.       Vitals:    03/29/22 0826   BP: 126/80   Pulse: 78   SpO2: 98%       Assessment:  Encounter Diagnoses   Name Primary?  Anxiety and depression Yes    Positive depression screening        Plan:  1. Anxiety and depression  Add to celexa for now- will see [facility] tomorrow- will have her assess for bipolar   - buPROPion (WELLBUTRIN XL) 150 MG extended release tablet; Take 1 tablet by mouth every morning  Dispense: 30 tablet; Refill: 3    2. Positive depression screening          Return in about 1 month (around 4/29/2022) for f/u mood . PHQ-9 score today: (PHQ-9 Total Score: 18), additional evaluation and assessment performed, follow-up plan includes but not limited to: Medication management and Referral to /Specialist  for evaluation and management.    Added wellbutrin and will see [facility] tomorrow   May need to see René Hernandez for med management

## 2022-03-30 ENCOUNTER — TELEPHONE (OUTPATIENT)
Dept: PSYCHOLOGY | Age: 27
End: 2022-03-30

## 2022-03-30 ENCOUNTER — OFFICE VISIT (OUTPATIENT)
Dept: PSYCHOLOGY | Age: 27
End: 2022-03-30
Payer: COMMERCIAL

## 2022-03-30 DIAGNOSIS — F31.9 BIPOLAR 1 DISORDER (HCC): Primary | ICD-10-CM

## 2022-03-30 DIAGNOSIS — F41.1 GAD (GENERALIZED ANXIETY DISORDER): ICD-10-CM

## 2022-03-30 PROCEDURE — 1036F TOBACCO NON-USER: CPT | Performed by: PSYCHOLOGIST

## 2022-03-30 PROCEDURE — 90832 PSYTX W PT 30 MINUTES: CPT | Performed by: PSYCHOLOGIST

## 2022-03-30 ASSESSMENT — ANXIETY QUESTIONNAIRES
4. TROUBLE RELAXING: 2
6. BECOMING EASILY ANNOYED OR IRRITABLE: 3
GAD7 TOTAL SCORE: 15
2. NOT BEING ABLE TO STOP OR CONTROL WORRYING: 2
3. WORRYING TOO MUCH ABOUT DIFFERENT THINGS: 2
7. FEELING AFRAID AS IF SOMETHING AWFUL MIGHT HAPPEN: 1
1. FEELING NERVOUS, ANXIOUS, OR ON EDGE: 3
5. BEING SO RESTLESS THAT IT IS HARD TO SIT STILL: 2

## 2022-03-30 ASSESSMENT — PATIENT HEALTH QUESTIONNAIRE - PHQ9
SUM OF ALL RESPONSES TO PHQ QUESTIONS 1-9: 17
9. THOUGHTS THAT YOU WOULD BE BETTER OFF DEAD, OR OF HURTING YOURSELF: 1
2. FEELING DOWN, DEPRESSED OR HOPELESS: 1
7. TROUBLE CONCENTRATING ON THINGS, SUCH AS READING THE NEWSPAPER OR WATCHING TELEVISION: 1
1. LITTLE INTEREST OR PLEASURE IN DOING THINGS: 2
SUM OF ALL RESPONSES TO PHQ QUESTIONS 1-9: 17
8. MOVING OR SPEAKING SO SLOWLY THAT OTHER PEOPLE COULD HAVE NOTICED. OR THE OPPOSITE, BEING SO FIGETY OR RESTLESS THAT YOU HAVE BEEN MOVING AROUND A LOT MORE THAN USUAL: 1
4. FEELING TIRED OR HAVING LITTLE ENERGY: 3
5. POOR APPETITE OR OVEREATING: 3
SUM OF ALL RESPONSES TO PHQ QUESTIONS 1-9: 17
3. TROUBLE FALLING OR STAYING ASLEEP: 3
SUM OF ALL RESPONSES TO PHQ9 QUESTIONS 1 & 2: 3
6. FEELING BAD ABOUT YOURSELF - OR THAT YOU ARE A FAILURE OR HAVE LET YOURSELF OR YOUR FAMILY DOWN: 2
SUM OF ALL RESPONSES TO PHQ QUESTIONS 1-9: 16

## 2022-03-30 NOTE — PROGRESS NOTES
Behavioral Health Consultation  Edwina De Santiago M.A. Psychology Assistant  Yvon Kaur, Ph.D. Supervising Psychologist  3/30/2022  3:10 PM EDT      Time spent with Patient: 35 minutes  This is patient's sixth  [facility] appointment. Reason for Consult:    Chief Complaint   Patient presents with    Anxiety    Depression     Referring Provider: Suzan Cooney, APRN - CNP  [facility],  [address] [place] [address]    Pt provided informed consent for the behavioral health program. Discussed with patient model of service to include the limits of confidentiality (i.e. abuse reporting, suicide intervention, etc.) and short-term intervention focused approach. Reviewed provision of services under supervision of licensed psychologist and obtained written consent. Pt indicated understanding. Feedback given to PCP. S:  Pt is experiencing benefit from self care strategies. Pt is experiencing fluctuations in mood and motivation and would like to get assessed for Bipolar disorder. Pt reported that her  also noticed and commented on the pattern of her mood changes. Processed Pt's desire for diagnoses. Pt reported feeling like she is not acting normal. Pt reported that she has a \"toddler tantrum\" and is \"out of control\" when in an argument with . Pt feels like her symptoms are unpredictable. Pt reported feeling like diagnosis will help guide treatment and improve symptoms. Pt reported some concerns about currently prescribed medication and Pt encouraged to discuss with prescribing provider. Pt meets criteria for Bipolar disorder (see assessment below). Feedback sent to PCP. Pt endorsed having a period of time, lasting at least four days, when her mood was so elevated, and people thought she wasn't her usual self.  Pt also reported period of time lasting at least four days, when she felt capable of taking on new projects (e.g. getting gym membership, working out, looking presentable, working on home improvement tasks) that people thought she wasn't her usual self. Pt reported doing a lot more work, chores, projects, or other activity than she usually does. Pt also reported that she has felt this way within the last month. Pt reported that she feels this way most of the day, nearly every day for at least 1 week and for at least 4 consecutive days. Pt has never been hospitalized for this. Pt reported that she acts very different from how she usually is and that it has been observed by both her  and mother. Pt reported that her  noticed her Sx when they first met about 7 years ago. Pt started noticing Sx in the past year. Sx not attributable to any medication or drugs. Mood Disorder Questionnaire  Has there ever been a period of time when you were not your usual self and:  1. You felt so good or so hyper that other people thought you were not your normal self or you were so hyper that you got into trouble? Yes  2. You were so irritable that you shouted at people or started fights or arguments? Yes  3. You felt much more self-confident than usual? Yes  4. You got much less sleep than usual and found that you didn't really miss it? Yes  5. You were more talkative or spoke much faster than usual? Yes  6. Thoughts raced through your head or you couldn't slow your mind down? Yes  7. You were so easily distracted by things around you that you had trouble concentrating or staying on track? Yes  8. You had more energy than usual? Yes  9. You were much more active or did more things than usual? Yes  10. You were much more social or outgoing than usual, for example, you telephoned friends in the middle of the night? No  11. You were much more interested in sex than usual? Yes  15. You did things that were unusual for you or that other people might have thought were excessive, foolish, or risky? Yes  13. Spending money got you or your family in trouble?  Yes    Have several of these ever happened during the same period of time? Yes    How much of a problem did any of these cause you-like being unable to work; having family, money or legal troubles; getting into arguments or fights?  [ ] No problems     [ ]  Minor problems     [ ] Moderate problems     [x] Serious problems    Scoring (must meet all 3 for positive screen result, indicating possible bipolar disorder): \"Yes\" to 7 or more of numbered items Yes  \"Yes\" to same period of time Yes  \"Moderate\" or \"Serious\" problems Yes    O:  MSE:    Appearance: adequate hygiene  Attitude: cooperative and friendly  Consciousness: alert  Orientation: oriented to person, place, time, general circumstance  Memory: recent and remote memory intact  Attention/Concentration: intact during session  Psychomotor Activity:normal  Eye Contact: normal  Speech: normal rate and volume, well-articulated  Mood: low  Affect: dysphoric  Perception: within normal limits  Thought Content: intrusive thoughts  Thought Process: logical, coherent and goal-directed  Insight: good  Judgment: intact  Ability to understand instructions: Yes  Ability to respond meaningfully: Yes  Morbid Ideation: passive thoughts of death  Suicide Assessment: no suicidal ideation, plan, or intent  Homicidal Ideation: no    History:    Medications:   Current Outpatient Medications   Medication Sig Dispense Refill    buPROPion (WELLBUTRIN XL) 150 MG extended release tablet Take 1 tablet by mouth every morning 30 tablet 3    citalopram (CELEXA) 40 MG tablet Take 1 tablet by mouth daily 90 tablet 2    MOSHE FE 1/20 1-20 MG-MCG per tablet        No current facility-administered medications for this visit.      Social History:   Social History     Socioeconomic History    Marital status: Single     Spouse name: Not on file    Number of children: Not on file    Years of education: Not on file    Highest education level: Not on file   Occupational History    Not on file   Tobacco Use    Smoking status: Never Smoker    Smokeless tobacco: Never Used   Vaping Use    Vaping Use: Never used   Substance and Sexual Activity    Alcohol use: Yes     Comment: social    Drug use: Never    Sexual activity: Yes     Partners: Male     Birth control/protection: Implant     Comment: nexplanon   Other Topics Concern    Not on file   Social History Narrative    Not on file     Social Determinants of Health     Financial Resource Strain:     Difficulty of Paying Living Expenses: Not on file   Food Insecurity:     Worried About Running Out of Food in the Last Year: Not on file    Jigna of Food in the Last Year: Not on file   Transportation Needs:     Lack of Transportation (Medical): Not on file    Lack of Transportation (Non-Medical): Not on file   Physical Activity:     Days of Exercise per Week: Not on file    Minutes of Exercise per Session: Not on file   Stress:     Feeling of Stress : Not on file   Social Connections:     Frequency of Communication with Friends and Family: Not on file    Frequency of Social Gatherings with Friends and Family: Not on file    Attends Jew Services: Not on file    Active Member of [address] [place] [address] or Organizations: Not on file    Attends Club or Organization Meetings: Not on file    Marital Status: Not on file   Intimate Partner Violence:     Fear of Current or Ex-Partner: Not on file    Emotionally Abused: Not on file    Physically Abused: Not on file    Sexually Abused: Not on file   Housing Stability:     Unable to Pay for Housing in the Last Year: Not on file    Number of [place] in the Last Year: Not on file    Unstable Housing in the Last Year: Not on file     TOBACCO:   reports that she has never smoked. She has never used smokeless tobacco.  ETOH:   reports current alcohol use.   Family History:   Family History   Problem Relation Age of Onset    High Blood Pressure Mother     High Cholesterol Mother     Asthma Mother     High Cholesterol Father     High Blood Pressure Maternal Grandmother     Other Maternal Grandmother         prediabetes    No Known Problems Paternal Grandmother     No Known Problems Paternal Grandfather     Breast Cancer Maternal Aunt        A:  Administered PHQ-9 and ANDRÉS-7 (see below). Patient endorses severe symptoms of depression and severe symptoms of anxiety. Pt endorsed passive thoughts of death, but denied current suicidal ideation, plan, and intent. PHQ Scores 3/30/2022 3/29/2022 3/11/2022 2/25/2022 10/25/2021 10/6/2020 11/4/2019   PHQ2 Score 3 4 3 3 2 0 0   PHQ9 Score 17 18 13 14 10 0 0     Interpretation of Total Score Depression Severity: 1-4 = Minimal depression, 5-9 = Mild depression, 10-14 = Moderate depression, 15-19 = Moderately severe depression, 20-27 = Severe depression     ANDRÉS 7 SCORE 3/30/2022 3/11/2022 2/25/2022   ANDRÉS-7 Total Score 15 10 9     Interpretation of ANDRÉS-7 score: 5-9 = mild anxiety, 10-14 = moderate anxiety, 15+ = severe anxiety. Recommend referral to behavioral health for scores 10 or greater. Diagnosis:    1. Bipolar 1 disorder ([facility] [address]    2. ANDRÉS (generalized anxiety disorder)          Diagnosis Date    Anxiety     Depression 3/7/2022    Herpes simplex virus (HSV) infection     cold sores    Mild pre-eclampsia, postpartum 2018    had to go back to the hospital- with second pregancy     Postpartum depression     Psychiatric problem     Sleep apnea     Stress incontinence     Varicella        Plan:  Pt interventions:  Established rapport, Conducted functional assessment and Supportive techniques. Conducted evaluative assessments.     Pt Behavioral Change Plan:   See Pt Instructions

## 2022-03-30 NOTE — PATIENT INSTRUCTIONS
1. Return to see Wu Bridges in about a month.     [facility]  [address]  Lorenzo Moya  [phone]  [email or web address]

## 2022-03-31 DIAGNOSIS — F31.9 BIPOLAR 1 DISORDER (HCC): Primary | ICD-10-CM

## 2022-03-31 NOTE — TELEPHONE ENCOUNTER
Best med for bipolar for this patient?  Santiago Glasgow did an eval and said she has bipolar 1- she is on celexa and wellbutrin at this time- I just added wellbutrin the other day because she said the celexa helped her anxiety but not the depression

## 2022-03-31 NOTE — PROGRESS NOTES
Let patient know that Wu Bridges did think she has bipolar 1. I talked to Benny Welsh- our psych NP who suggested starting [place].  Can still take celexa and wellbutrin- follow up in 1 month

## 2022-03-31 NOTE — TELEPHONE ENCOUNTER
Let patient know that Steffen Espinosa did think she has bipolar 1. I talked to Juan Bowman- our psych NP who suggested starting [place].  Can still take celexa and wellbutrin- follow up in 1 month

## 2022-03-31 NOTE — TELEPHONE ENCOUNTER
Try latuda. It is indicated for bipolar depression and has less side effects of weight gain, metabolic concerns, etc. Also be sure to tell the patient to take it with a meal as it needs to be taken with food in order for it to be absorbed so it can work.

## 2022-04-01 NOTE — PROGRESS NOTES
Patient contacted the office, explained what PCP had suggested. Patient will  the new medication.  No further questions

## 2022-04-04 RX ORDER — ARIPIPRAZOLE 5 MG/1
TABLET ORAL
Qty: 42 TABLET | Refills: 1 | Status: SHIPPED | OUTPATIENT
Start: 2022-04-04 | End: 2022-05-18 | Stop reason: SINTOL

## 2022-05-15 ENCOUNTER — PATIENT MESSAGE (OUTPATIENT)
Dept: FAMILY MEDICINE CLINIC | Age: 27
End: 2022-05-15

## 2022-05-16 NOTE — TELEPHONE ENCOUNTER
From: Marilyn Kitchen  To: Vladimir Hurtado  Sent: 5/15/2022 2:39 PM EDT  Subject: Virtual Visit    Good afternoon,    I was wondering if I could schedule an appt with Dr. Monserrat Seth for a virtual follow up visit. I am open through the whole week next week. Thank you.

## 2022-05-17 ENCOUNTER — TELEMEDICINE (OUTPATIENT)
Dept: FAMILY MEDICINE CLINIC | Age: 27
End: 2022-05-17
Payer: COMMERCIAL

## 2022-05-17 DIAGNOSIS — F31.9 BIPOLAR 1 DISORDER (HCC): Primary | ICD-10-CM

## 2022-05-17 PROCEDURE — 99213 OFFICE O/P EST LOW 20 MIN: CPT | Performed by: NURSE PRACTITIONER

## 2022-05-17 PROCEDURE — G8417 CALC BMI ABV UP PARAM F/U: HCPCS | Performed by: NURSE PRACTITIONER

## 2022-05-17 PROCEDURE — G8427 DOCREV CUR MEDS BY ELIG CLIN: HCPCS | Performed by: NURSE PRACTITIONER

## 2022-05-17 PROCEDURE — 1036F TOBACCO NON-USER: CPT | Performed by: NURSE PRACTITIONER

## 2022-05-17 ASSESSMENT — ENCOUNTER SYMPTOMS
WHEEZING: 0
SHORTNESS OF BREATH: 0

## 2022-05-17 NOTE — PROGRESS NOTES
2022    TELEHEALTH EVALUATION -- Audio/Visual (During CHUQW-19 public health emergency)    HPI:    Jonathan Greenberg (:  1995) has requested an audio/video evaluation for the following concern(s):    Bipolar: taking the abilify, celexa and wellbutrin, she states since starting the abilify Feels like mood is more stable overall but  she has had  restless thighs- gets better if she puts pressure on them or stretches  Brain fog every few days. Review of Systems   Constitutional: Negative for chills and fever. Respiratory: Negative for shortness of breath and wheezing. Cardiovascular: Negative for chest pain and palpitations. Neurological:        RLS   Psychiatric/Behavioral: Positive for confusion (Brain fog). Prior to Visit Medications    Medication Sig Taking?  Authorizing Provider   norethindrone-ethinyl estradiol (LOESTRIN FE ) 1-20 MG-MCG per tablet Take 1 tablet by mouth daily Yes Tra Dodson MD   ARIPiprazole (ABILIFY) 5 MG tablet Take 1 tablet daily for 2 weeks and then increase to 2 tablets daily Yes JAKOB John CNP   buPROPion (WELLBUTRIN XL) 150 MG extended release tablet Take 1 tablet by mouth every morning Yes JAKOB John CNP   citalopram (CELEXA) 40 MG tablet Take 1 tablet by mouth daily Yes JAKOB John CNP       Social History     Tobacco Use    Smoking status: Never Smoker    Smokeless tobacco: Never Used   Vaping Use    Vaping Use: Never used   Substance Use Topics    Alcohol use: Yes     Comment: social    Drug use: Never        Allergies   Allergen Reactions    Sulfa Antibiotics Other (See Comments)     Unsure of reaction    Pcn [Penicillins] Rash   ,   Past Medical History:   Diagnosis Date    Anxiety     Depression 3/7/2022    Herpes simplex virus (HSV) infection     cold sores    Mild pre-eclampsia, postpartum 2018    had to go back to the hospital- with second pregancy     Postpartum depression     Psychiatric problem     Sleep apnea     Stress incontinence     Varicella    ,   Past Surgical History:   Procedure Laterality Date    BLADDER SURGERY      when she was around 3 yo   ,   Family History   Problem Relation Age of Onset    High Blood Pressure Mother     High Cholesterol Mother     Asthma Mother     High Cholesterol Father     High Blood Pressure Maternal Grandmother     Other Maternal Grandmother         prediabetes    No Known Problems Paternal Grandmother     No Known Problems Paternal Grandfather     Breast Cancer Maternal Aunt        PHYSICAL EXAMINATION:  [ INSTRUCTIONS:  \"[x]\" Indicates a positive item  \"[]\" Indicates a negative item  -- DELETE ALL ITEMS NOT EXAMINED]  Vital Signs: (As obtained by patient/caregiver or practitioner observation)    Blood pressure-  Heart rate-    Respiratory rate-    Temperature-  Pulse oximetry-     Constitutional: [x] Appears well-developed and well-nourished [x] No apparent distress      [] Abnormal-   Mental status  [x] Alert and awake  [x] Oriented to person/place/time [x]Able to follow commands      Eyes:  EOM    []  Normal  [] Abnormal-  Sclera  [x]  Normal  [] Abnormal -         Discharge []  None visible  [] Abnormal -    HENT:   [x] Normocephalic, atraumatic.   [] Abnormal   [] Mouth/Throat: Mucous membranes are moist.     External Ears [] Normal  [] Abnormal-     Neck: [] No visualized mass     Pulmonary/Chest: [x] Respiratory effort normal.  [x] No visualized signs of difficulty breathing or respiratory distress        [] Abnormal-      Musculoskeletal:   [] Normal gait with no signs of ataxia         [x] Normal range of motion of neck        [] Abnormal-       Neurological:        [x] No Facial Asymmetry (Cranial nerve 7 motor function) (limited exam to video visit)          [x] No gaze palsy        [] Abnormal-         Skin:        [x] No significant exanthematous lesions or discoloration noted on facial skin         [] Abnormal- Psychiatric:       [x] Normal Affect [x] No Hallucinations        [] Abnormal-     Other pertinent observable physical exam findings-     ASSESSMENT/PLAN:  1. Bipolar 1 disorder ([address]  Continue celexa and wellbutrin- go back to the 5mg on abilify  I will send message to Krystina Calabrese NP and see what she recommends       No follow-ups on file. Kt Stevenson, was evaluated through a synchronous (real-time) audio-video encounter. The patient (or guardian if applicable) is aware that this is a billable service, which includes applicable co-pays. This Virtual Visit was conducted with patient's (and/or legal guardian's) consent. The visit was conducted pursuant to the emergency declaration under the [address] [place] [address], [address] [place] [address] authority and the [employer] and [employer] General Act. Patient identification was verified, and a caregiver was present when appropriate. The patient was located at home in a state where the provider was licensed to provide care. Total time spent on this encounter: Not billed by time    --JAKOB Marni CNP on 5/17/2022 at 6:55 PM    An electronic signature was used to authenticate this note.

## 2022-05-18 DIAGNOSIS — F31.9 BIPOLAR 1 DISORDER (HCC): Primary | ICD-10-CM

## 2022-05-18 NOTE — TELEPHONE ENCOUNTER
Let patient know that I sent in [facility]- she should take this instead of the abilify

## 2022-05-19 NOTE — TELEPHONE ENCOUNTER
Received call from Pt and stated that even with the coupon the medication is over $1,000. Pt is requesting that something else be called in.      Please advise

## 2022-05-21 RX ORDER — QUETIAPINE 300 MG/1
300 TABLET, FILM COATED, EXTENDED RELEASE ORAL NIGHTLY
Qty: 30 TABLET | Refills: 3 | Status: SHIPPED | OUTPATIENT
Start: 2022-05-21

## 2022-05-21 NOTE — TELEPHONE ENCOUNTER
Sorry-it is so very frustrating because of insurance! Then seroquel XR which is generic and should not be a problem to get. I like it better than regular seroquel because only have to take it once a day in the evening around 7 pm or so-doesn't make you sleepy until 2 or 3 hours after it is taken. It still has the chance for weight gain and increase in blood sugar, triglycerides, cholesterol. If seroquel XR is not approved for her insurance then would have to go with immediate release seroquel. Hope this helps!

## 2022-05-24 ENCOUNTER — TELEMEDICINE (OUTPATIENT)
Dept: PSYCHOLOGY | Age: 27
End: 2022-05-24
Payer: COMMERCIAL

## 2022-05-24 DIAGNOSIS — F31.9 BIPOLAR 1 DISORDER (HCC): Primary | ICD-10-CM

## 2022-05-24 DIAGNOSIS — F41.9 ANXIETY: ICD-10-CM

## 2022-05-24 PROCEDURE — 1036F TOBACCO NON-USER: CPT | Performed by: PSYCHOLOGIST

## 2022-05-24 PROCEDURE — 90832 PSYTX W PT 30 MINUTES: CPT | Performed by: PSYCHOLOGIST

## 2022-05-24 ASSESSMENT — PATIENT HEALTH QUESTIONNAIRE - PHQ9
2. FEELING DOWN, DEPRESSED OR HOPELESS: 0
SUM OF ALL RESPONSES TO PHQ9 QUESTIONS 1 & 2: 0
1. LITTLE INTEREST OR PLEASURE IN DOING THINGS: 0
SUM OF ALL RESPONSES TO PHQ QUESTIONS 1-9: 0

## 2022-05-24 ASSESSMENT — ANXIETY QUESTIONNAIRES
4. TROUBLE RELAXING: 1
5. BEING SO RESTLESS THAT IT IS HARD TO SIT STILL: 2
1. FEELING NERVOUS, ANXIOUS, OR ON EDGE: 1
2. NOT BEING ABLE TO STOP OR CONTROL WORRYING: 1
6. BECOMING EASILY ANNOYED OR IRRITABLE: 1
3. WORRYING TOO MUCH ABOUT DIFFERENT THINGS: 1
GAD7 TOTAL SCORE: 7
7. FEELING AFRAID AS IF SOMETHING AWFUL MIGHT HAPPEN: 0

## 2022-05-24 NOTE — PROGRESS NOTES
Behavioral Health Consultation  Liliane Velázquez M.A. Psychology Assistant  Mary Combs, Ph.D. Supervising Psychologist  5/24/2022  9:41 AM EDT      Time spent with Patient: 25 minutes  This is patient's sixth  [facility] appointment. Reason for Consult:    Chief Complaint   Patient presents with    Anxiety     Referring Provider: Jeanette Payne, APRN - CNP  [id number] [place] [address]    Pt provided informed consent for the behavioral health program. Discussed with patient model of service to include the limits of confidentiality (i.e. abuse reporting, suicide intervention, etc.) and short-term intervention focused approach. Reviewed provision of services under supervision of licensed psychologist and obtained written consent. Pt indicated understanding. Feedback given to PCP. S:  Pt reported significant improvements to mood symptoms. Pt reported improved relationship with her partner and reported less fights. Pt is able to regulate her emotions and her partner has noticed the improvements. Pt reported benefit from her current medication although she is experiencing side effects. Pt will be starting a different prescription soon. Pt reported a stable mood and has not felt really high or low recently. [facility] and Pt discussed relapse prevention.      O:  MSE:    Appearance: good hygiene   Attitude: cooperative and friendly  Consciousness: alert  Orientation: oriented to person, place, time, general circumstance  Memory: recent and remote memory intact  Attention/Concentration: intact during session  Psychomotor Activity:normal  Eye Contact: normal  Speech: normal rate and volume, well-articulated  Mood: normal  Affect: euthymic  Perception: within normal limits  Thought Content: within normal limits  Thought Process: logical, coherent and goal-directed  Insight: good  Judgment: intact  Ability to understand instructions: Yes  Ability to respond meaningfully: Yes  Morbid Ideation: no   Suicide Assessment: no suicidal ideation, plan, or intent  Homicidal Ideation: no    History:    Medications:   Current Outpatient Medications   Medication Sig Dispense Refill    QUEtiapine (SEROQUEL XR) 300 MG extended release tablet Take 1 tablet by mouth nightly 30 tablet 3    lurasidone (LATUDA) 20 MG TABS tablet Take 1 tablet by mouth daily 30 tablet 2    norethindrone-ethinyl estradiol (LOESTRIN FE 1/20) 1-20 MG-MCG per tablet Take 1 tablet by mouth daily 1 packet 3    buPROPion (WELLBUTRIN XL) 150 MG extended release tablet Take 1 tablet by mouth every morning 30 tablet 3    citalopram (CELEXA) 40 MG tablet Take 1 tablet by mouth daily 90 tablet 2     No current facility-administered medications for this visit. Social History:   Social History     Socioeconomic History    Marital status: Single     Spouse name: Not on file    Number of children: Not on file    Years of education: Not on file    Highest education level: Not on file   Occupational History    Not on file   Tobacco Use    Smoking status: Never Smoker    Smokeless tobacco: Never Used   Vaping Use    Vaping Use: Never used   Substance and Sexual Activity    Alcohol use: Yes     Comment: social    Drug use: Never    Sexual activity: Yes     Partners: Male     Birth control/protection: Implant     Comment: nexplanon   Other Topics Concern    Not on file   Social History Narrative    Not on file     Social Determinants of Health     Financial Resource Strain:     Difficulty of Paying Living Expenses: Not on file   Food Insecurity:     Worried About Running Out of Food in the Last Year: Not on file    Jigna of Food in the Last Year: Not on file   Transportation Needs:     Lack of Transportation (Medical): Not on file    Lack of Transportation (Non-Medical):  Not on file   Physical Activity:     Days of Exercise per Week: Not on file    Minutes of Exercise per Session: Not on file   Stress:     Feeling of Stress : Not on file Social Connections:     Frequency of Communication with Friends and Family: Not on file    Frequency of Social Gatherings with Friends and Family: Not on file    Attends Zoroastrian Services: Not on file    Active Member of Clubs or Organizations: Not on file    Attends Club or Organization Meetings: Not on file    Marital Status: Not on file   Intimate Partner Violence:     Fear of Current or Ex-Partner: Not on file    Emotionally Abused: Not on file    Physically Abused: Not on file    Sexually Abused: Not on file   Housing Stability:     Unable to Pay for Housing in the Last Year: Not on file    Number of [place] in the Last Year: Not on file    Unstable Housing in the Last Year: Not on file     TOBACCO:   reports that she has never smoked. She has never used smokeless tobacco.  ETOH:   reports current alcohol use. Family History:   Family History   Problem Relation Age of Onset    High Blood Pressure Mother     High Cholesterol Mother     Asthma Mother     High Cholesterol Father     High Blood Pressure Maternal Grandmother     Other Maternal Grandmother         prediabetes    No Known Problems Paternal Grandmother     No Known Problems Paternal Grandfather     Breast Cancer Maternal Aunt        A:  Administered PHQ-9 and ANDRÉS-7 (see below). Patient endorses minimal symptoms of depression and mild symptoms of anxiety. Denies SI.     PHQ Scores 5/24/2022 3/30/2022 3/29/2022 3/11/2022 2/25/2022 10/25/2021 10/6/2020   PHQ2 Score 0 3 4 3 3 2 0   PHQ9 Score 0 17 18 13 14 10 0     Interpretation of Total Score Depression Severity: 1-4 = Minimal depression, 5-9 = Mild depression, 10-14 = Moderate depression, 15-19 = Moderately severe depression, 20-27 = Severe depression     ANDRÉS 7 SCORE 5/24/2022 3/30/2022 3/11/2022 2/25/2022   ANDRÉS-7 Total Score 7 15 10 9     Interpretation of ANDRÉS-7 score: 5-9 = mild anxiety, 10-14 = moderate anxiety, 15+ = severe anxiety.  Recommend referral to behavioral health for scores 10 or greater. Diagnosis:    1. Bipolar 1 disorder ([facility] [address]    2. Anxiety          Diagnosis Date    Anxiety     Depression 3/7/2022    Herpes simplex virus (HSV) infection     cold sores    Mild pre-eclampsia, postpartum 2018    had to go back to the hospital- with second pregancy     Postpartum depression     Psychiatric problem     Sleep apnea     Stress incontinence     Varicella        Plan:  Pt interventions:  Established rapport, Conducted functional assessment and Supportive techniques. Discussed relapse prevention.     Pt Behavioral Change Plan:   See Pt Instructions

## 2022-05-24 NOTE — PATIENT INSTRUCTIONS
1. Return to [facility] in about a month. Relapse Prevention Strategies  - Continue to use your prescribed medication and discuss with PCP or [facility] before discontinuing  - Leaning on partner and mother for social support  - Practice taking space when in an argument. - Consider integrating some self-care activities to improve mood and reduce stress. Focus on improving sleep, appetite, movement, engagement in your hobbies & social connectedness.

## 2022-08-17 ENCOUNTER — TELEMEDICINE (OUTPATIENT)
Dept: PSYCHOLOGY | Age: 27
End: 2022-08-17
Payer: COMMERCIAL

## 2022-08-17 DIAGNOSIS — F31.9 BIPOLAR 1 DISORDER (HCC): Primary | ICD-10-CM

## 2022-08-17 DIAGNOSIS — F41.9 ANXIETY: ICD-10-CM

## 2022-08-17 PROCEDURE — 90832 PSYTX W PT 30 MINUTES: CPT | Performed by: PSYCHOLOGIST

## 2022-08-17 NOTE — PATIENT INSTRUCTIONS
Consider scheduling \"staff meetings\" with your  to review practical issues (budgeting, scheduling) and to check in with each other about emotional needs as well. Remember to use grounding techniques to help calm down when on a communication break with your . Return to see Dr. Ricardo Nicole in 3 months. Grounding  Grounding is a technique that helps keep you focused on the present moment by reorienting you to reality in the here-and-now. Grounding skills can be helpful in managing overwhelming feelings or intense anxiety. They help you to regain your mental focus from an often intensely emotional state. Grounding skills occur within two specific approaches:   Sensory Awareness and Cognitive Awareness    1. Sensory Awareness (awareness of senses)    Grounding Exercise #1:   Keep your eyes open, look around the room, notice your surroundings, notice  details. Hold a pillow, stuffed animal or a ball. Place a cool cloth or hold something cool (e.g., can of soda) on your forehead or the inside of your wrist   Listen to soothing music   Put your feet firmly on the ground   FOCUS on someones voice or a neutral conversation. Grounding Exercise #2:   The [id number] Exercise   Name 5 things you can see in the room with you. Name 4 things you can feel Arthea Rob on my back or feet on floor)   Name 3 things you can hear right now (fingers tapping on keyboard or tv)   Name 2 things you can smell right now (or, 2 things you like the smell of)   Name 1 good thing about yourself    Grounding Exercise #3  5/5/5 Grounding Exercise  What are 5 things you can see? What are 5 things you can feel? What are 5 things you can hear?    -Repeat with 4 different observations for each, 3 different for each, 2 different for each, etc.   -If you get to 1 and are still feeling anxious, re-start at 5 with 5 different things you can see. 2. Cognitive Awareness (awareness of thoughts)    Grounding Exercise #4:   Re-orient yourself in place and time by asking yourself some or all of these questions:  1. Where am I?  2. What is today? 3. What is the date? 4. What is the month? 5. What is the year? 6. How old am I?  7. What season is it?

## 2022-08-17 NOTE — PROGRESS NOTES
back number: [phone]   Patient's emergency contact's name and number, as well as permission to contact them if needed: Extended Emergency Contact Information  Primary Emergency Contact: Asia Andrade  Mobile Phone: [phone]  Relation: Parent  Preferred language: English   needed? No  Secondary Emergency Contact: Benito Bedoya  Mobile Phone: [phone]  Relation: Spouse  Preferred language: English   needed? No     Provider location: [address]:  Patient reported that her mood has been mostly stable since May. She describes increased situational stress related to selling her home, finding a new house. Her daughter is starting  tomorrow. She describes ongoing tension in her marriage, but notes that they continue working on their communication. She notes benefit from her current medication, which reduces reactivity and helps with communication with her spouse. They have successfully used communication time out to help limit arguments. Reviewed grounding exercises as a strategy to help reduce reactivity. Overall, patient feels that her mood is stable.      O:  MSE:    Appearance: good hygiene   Attitude: cooperative and friendly  Consciousness: alert  Orientation: oriented to person, place, time, general circumstance  Memory: recent and remote memory intact  Attention/Concentration: intact during session  Psychomotor Activity:normal  Eye Contact: normal  Speech: normal rate and volume, well-articulated  Mood: stable  Affect: euthymic  Perception: within normal limits  Thought Content: all-or-none thinking and intrusive thoughts  Thought Process: logical, coherent and goal-directed  Insight: good  Judgment: intact  Ability to understand instructions: Yes  Ability to respond meaningfully: Yes  Morbid Ideation: no   Suicide Assessment: no suicidal ideation, plan, or intent  Homicidal Ideation: no    History:    Medications:   Current Outpatient Medications   Medication Grandmother     No Known Problems Paternal Grandfather     Breast Cancer Maternal Aunt      A:  Patient engaged and cooperative. Denies SI. Insight and motivation are good. Diagnosis:    1. Bipolar 1 disorder ([address]    2. Anxiety          Diagnosis Date    Anxiety     Depression 3/7/2022    Herpes simplex virus (HSV) infection     cold sores    Mild pre-eclampsia, postpartum 2018    had to go back to the hospital- with second pregancy     Postpartum depression     Psychiatric problem     Sleep apnea     Stress incontinence     Varicella      Plan:  Pt interventions:  Practiced assertive communication, Conducted functional assessment, [place]-setting to identify pt's primary goals for [facility] visit / overall health, Supportive techniques, Emphasized self-care as important for managing overall health, and Provided Psychoeducation re: grounding.     Pt Behavioral Change Plan:   See Pt Instructions

## 2022-11-16 ENCOUNTER — TELEMEDICINE (OUTPATIENT)
Dept: PSYCHOLOGY | Age: 27
End: 2022-11-16
Payer: COMMERCIAL

## 2022-11-16 DIAGNOSIS — F31.9 BIPOLAR 1 DISORDER (HCC): Primary | ICD-10-CM

## 2022-11-16 DIAGNOSIS — F41.9 ANXIETY: ICD-10-CM

## 2022-11-16 PROCEDURE — 90832 PSYTX W PT 30 MINUTES: CPT | Performed by: PSYCHOLOGIST

## 2022-11-16 NOTE — PROGRESS NOTES
Behavioral Health Consultation  Javed Francis, Ph.D.  Psychologist  11/16/2022  8:53 AM EST      Time spent with Patient: 18 minutes  This is patient's second [facility] appointment. Reason for Consult:    Chief Complaint   Patient presents with    Anxiety     Referring Provider: JAKOB Ghosh - [facility]  [id number] [address]    Pt provided informed consent for the behavioral health program. Discussed with patient model of service to include the limits of confidentiality (i.e. abuse reporting, suicide intervention, etc.) and short-term intervention focused approach. Pt indicated understanding. Feedback given to PCP. TELEHEALTH VISIT -- Audio/Visual (During DKALL-10 public health emergency)    Pursuant to the emergency declaration under the [address] [place] [address], [facility] waiver authority and the [employer] Act, this Virtual Visit was conducted, with patient's consent, to reduce the patient's risk of exposure to COVID-19 and provide continuity of care for an established patient. Services were provided through a video synchronous discussion virtually to substitute for in-person clinic visit. Pt gave verbal informed consent to participate in telehealth services. Conducted a risk-benefit analysis and determined that the patient's presenting problems are consistent with the use of telepsychology. Determined that the patient has sufficient knowledge and skills in the use of technology enabling them to adequately benefit from telepsychology. It was determined that this patient was able to be properly treated without an in-person session. Patient verified that they were currently located at the [facility] address that was provided during registration.     Verified the following information:  Patient's identification: Yes  Patient location[place] [address] [address]   Patient's call back number: [phone]   Patient's emergency contact's name and number, as well as permission to contact them if needed: Extended Emergency Contact Information  Primary Emergency Contact: Baker Pennant  Mobile Phone: [phone]  Relation: Parent  Preferred language: English   needed? No  Secondary Emergency Contact: Mango Paul  Mobile Phone: [phone]  Relation: Spouse  Preferred language: English   needed? No     Provider location: [address]:  Patient reported that she and her  have been working on their communication. Overall, they are arguing less. She feels they are mostly respectful during arguments. However, she did note that she has still struggled with reactivity at times. She feels bad and guilty after snapping at her , tries to focus on grounding. She often has difficulty de-escalating due to increased distress at these times. As a couple, they are stressed. They are planning to move back to [place]. Have sold their home, are living with her mother while they look for a new home. Discussed ways to increase positive connections in her marriage.     O:  MSE:    Appearance: good hygiene   Attitude: cooperative and friendly  Consciousness: alert  Orientation: oriented to person, place, time, general circumstance  Memory: recent and remote memory intact  Attention/Concentration: intact during session  Psychomotor Activity:normal  Eye Contact: normal  Speech: normal rate and volume, well-articulated  Mood: stressed  Affect: euthymic  Perception: within normal limits  Thought Content: all-or-none thinking and intrusive thoughts  Thought Process: logical, coherent and goal-directed  Insight: good  Judgment: intact  Ability to understand instructions: Yes  Ability to respond meaningfully: Yes  Morbid Ideation: no   Suicide Assessment: no suicidal ideation, plan, or intent  Homicidal Ideation: no    History:    Medications:   Current Outpatient Medications   Medication Sig Dispense Refill QUEtiapine (SEROQUEL XR) 300 MG extended release tablet Take 1 tablet by mouth nightly 30 tablet 3    lurasidone (LATUDA) 20 MG TABS tablet Take 1 tablet by mouth daily 30 tablet 2    norethindrone-ethinyl estradiol (LOESTRIN FE 1/20) 1-20 MG-MCG per tablet Take 1 tablet by mouth daily 1 packet 3    buPROPion (WELLBUTRIN XL) 150 MG extended release tablet Take 1 tablet by mouth every morning 30 tablet 3    citalopram (CELEXA) 40 MG tablet Take 1 tablet by mouth daily 90 tablet 2     No current facility-administered medications for this visit. Social History:   Social History     Socioeconomic History    Marital status: Single     Spouse name: Not on file    Number of children: Not on file    Years of education: Not on file    Highest education level: Not on file   Occupational History    Not on file   Tobacco Use    Smoking status: Never    Smokeless tobacco: Never   Vaping Use    Vaping Use: Never used   Substance and Sexual Activity    Alcohol use: Yes     Comment: social    Drug use: Never    Sexual activity: Yes     Partners: Male     Birth control/protection: Implant     Comment: nexplanon   Other Topics Concern    Not on file   Social History Narrative    Not on file     Social Determinants of Health     Financial Resource Strain: Not on file   Food Insecurity: Not on file   Transportation Needs: Not on file   Physical Activity: Not on file   Stress: Not on file   Social Connections: Not on file   Intimate Partner Violence: Not on file   Housing Stability: Not on file     TOBACCO:   reports that she has never smoked. She has never used smokeless tobacco.  ETOH:   reports current alcohol use.   Family History:   Family History   Problem Relation Age of Onset    High Blood Pressure Mother     High Cholesterol Mother     Asthma Mother     High Cholesterol Father     High Blood Pressure Maternal Grandmother     Other Maternal Grandmother         prediabetes    No Known Problems Paternal Grandmother     No Known Problems Paternal Grandfather     Breast Cancer Maternal Aunt      A:  Patient engaged and cooperative. Denies SI. Insight and motivation are good. Diagnosis:    1. Bipolar 1 disorder ([facility] [address]    2. Anxiety          Diagnosis Date    Anxiety     Depression 3/7/2022    Herpes simplex virus (HSV) infection     cold sores    Mild pre-eclampsia, postpartum 2018    had to go back to the hospital- with second pregancy     Postpartum depression     Psychiatric problem     Sleep apnea     Stress incontinence     Varicella      Plan:  Pt interventions:  Trained in improving communication skills, Conducted functional assessment, [place]-setting to identify pt's primary goals for [facility] visit / overall health, Supportive techniques, Emphasized self-care as important for managing overall health, and Problem-solving re: disconnection in her marriage.     Pt Behavioral Change Plan:   See Pt Instructions

## 2022-11-16 NOTE — PATIENT INSTRUCTIONS
Work on repairing your connection with your  after arguments. Be sure to schedule the \"meetings\" with your  weekly. Write this down somewhere! During your meetings, identify specific time frames during the week for \"me\" time and \"we\" time.

## 2022-11-27 DIAGNOSIS — F41.9 ANXIETY AND DEPRESSION: ICD-10-CM

## 2022-11-27 DIAGNOSIS — F32.A ANXIETY AND DEPRESSION: ICD-10-CM

## 2022-11-28 ENCOUNTER — TELEPHONE (OUTPATIENT)
Dept: FAMILY MEDICINE CLINIC | Age: 27
End: 2022-11-28

## 2022-11-28 DIAGNOSIS — F32.A ANXIETY AND DEPRESSION: ICD-10-CM

## 2022-11-28 DIAGNOSIS — F41.9 ANXIETY AND DEPRESSION: ICD-10-CM

## 2022-11-28 DIAGNOSIS — H91.90 HEARING DISORDER, UNSPECIFIED LATERALITY: Primary | ICD-10-CM

## 2022-11-28 RX ORDER — QUETIAPINE 300 MG/1
300 TABLET, FILM COATED, EXTENDED RELEASE ORAL NIGHTLY
Qty: 30 TABLET | Refills: 3 | Status: SHIPPED | OUTPATIENT
Start: 2022-11-28

## 2022-11-28 RX ORDER — CITALOPRAM 40 MG/1
40 TABLET ORAL DAILY
Qty: 90 TABLET | Refills: 2 | Status: SHIPPED | OUTPATIENT
Start: 2022-11-28

## 2022-11-28 RX ORDER — BUPROPION HYDROCHLORIDE 150 MG/1
TABLET ORAL
Qty: 30 TABLET | Refills: 0 | Status: SHIPPED | OUTPATIENT
Start: 2022-11-28

## 2022-11-28 NOTE — TELEPHONE ENCOUNTER
Pharmacy req refill for patient on  Quetiapine ER 300mg tabs  Last visit 5/17/22  No future  [facility]

## 2022-12-22 ENCOUNTER — E-VISIT (OUTPATIENT)
Dept: FAMILY MEDICINE CLINIC | Age: 27
End: 2022-12-22
Payer: COMMERCIAL

## 2022-12-22 DIAGNOSIS — H92.09 OTALGIA, UNSPECIFIED LATERALITY: ICD-10-CM

## 2022-12-22 DIAGNOSIS — J01.90 ACUTE BACTERIAL SINUSITIS: Primary | ICD-10-CM

## 2022-12-22 DIAGNOSIS — B96.89 ACUTE BACTERIAL SINUSITIS: Primary | ICD-10-CM

## 2022-12-22 PROCEDURE — 99421 OL DIG E/M SVC 5-10 MIN: CPT | Performed by: NURSE PRACTITIONER

## 2022-12-22 RX ORDER — DOXYCYCLINE HYCLATE 100 MG
100 TABLET ORAL 2 TIMES DAILY
Qty: 20 TABLET | Refills: 0 | Status: SHIPPED | OUTPATIENT
Start: 2022-12-22

## 2022-12-22 ASSESSMENT — LIFESTYLE VARIABLES: SMOKING_STATUS: NO, I'VE NEVER SMOKED

## 2023-01-16 ENCOUNTER — TELEMEDICINE (OUTPATIENT)
Dept: PSYCHOLOGY | Age: 28
End: 2023-01-16
Payer: COMMERCIAL

## 2023-01-16 DIAGNOSIS — F41.9 ANXIETY: ICD-10-CM

## 2023-01-16 DIAGNOSIS — F31.9 BIPOLAR 1 DISORDER (HCC): Primary | ICD-10-CM

## 2023-01-16 PROCEDURE — 90832 PSYTX W PT 30 MINUTES: CPT | Performed by: PSYCHOLOGIST

## 2023-01-16 NOTE — PROGRESS NOTES
Behavioral Health Consultation  Sylvia Yates, Ph.D.  Psychologist  1/16/2023  8:46 AM EST      Time spent with Patient: 20 minutes  This is patient's third [facility] appointment. Reason for Consult:    Chief Complaint   Patient presents with    Anxiety     Referring Provider: Everette Carrion, APRN - CNP  [id number] [facility]  [facility]    Pt provided informed consent for the behavioral health program. Discussed with patient model of service to include the limits of confidentiality (i.e. abuse reporting, suicide intervention, etc.) and short-term intervention focused approach. Pt indicated understanding. Feedback given to PCP. TELEHEALTH VISIT -- Audio/Visual (During GWAYZ-01 public health emergency)    Pursuant to the emergency declaration under the [address] [place] [address], [facility] waiver authority and the [employer] Act, this Virtual Visit was conducted, with patient's consent, to reduce the patient's risk of exposure to COVID-19 and provide continuity of care for an established patient. Services were provided through a video synchronous discussion virtually to substitute for in-person clinic visit. Pt gave verbal informed consent to participate in telehealth services. Conducted a risk-benefit analysis and determined that the patient's presenting problems are consistent with the use of telepsychology. Determined that the patient has sufficient knowledge and skills in the use of technology enabling them to adequately benefit from telepsychology. It was determined that this patient was able to be properly treated without an in-person session. Patient verified that they were currently located at the [facility] address that was provided during registration.     Verified the following information:  Patient's identification: Yes  Patient location: [address] [place] [address]   Patient's call back number: [phone]   Patient's emergency contact's name and number, as well as permission to contact them if needed: Extended Emergency Contact Information  Primary Emergency Contact: Faiza Edmond  Mobile Phone: [phone]  Relation: Parent  Preferred language: English   needed? No  Secondary Emergency Contact: Crys Oshea  Mobile Phone: [phone]  Relation: Spouse  Preferred language: English   needed? No     Provider location: [facility]:  Patient reported that she continues to note improvement in communication with her . She has made a point to return to arguments after calming down to be sure she has heard him clearly. Still hopes to interrupt him less, be more focused and present during arguments. Reviewed communication time out strategies.     O:  MSE:    Appearance: good hygiene   Attitude: cooperative and friendly  Consciousness: alert  Orientation: oriented to person, place, time, general circumstance  Memory: recent and remote memory intact  Attention/Concentration: intact during session  Psychomotor Activity:normal  Eye Contact: normal  Speech: normal rate and volume, well-articulated  Mood: stable  Affect: euthymic  Perception: within normal limits  Thought Content: within normal limits  Thought Process: logical, coherent and goal-directed  Insight: good  Judgment: intact  Ability to understand instructions: Yes  Ability to respond meaningfully: Yes  Morbid Ideation: no   Suicide Assessment: no suicidal ideation, plan, or intent  Homicidal Ideation: no    History:    Medications:   Current Outpatient Medications   Medication Sig Dispense Refill    doxycycline hyclate (VIBRA-TABS) 100 MG tablet Take 1 tablet by mouth 2 times daily 20 tablet 0    buPROPion (WELLBUTRIN XL) 150 MG extended release tablet TAKE 1 TABLET BY MOUTH ONCE DAILY IN THE MORNING 30 tablet 0    citalopram (CELEXA) 40 MG tablet Take 1 tablet by mouth daily 90 tablet 2    QUEtiapine (SEROQUEL XR) 300 MG extended release tablet Take 1 tablet by mouth nightly 30 tablet 3    lurasidone (LATUDA) 20 MG TABS tablet Take 1 tablet by mouth daily 30 tablet 2    norethindrone-ethinyl estradiol (LOESTRIN FE 1/20) 1-20 MG-MCG per tablet Take 1 tablet by mouth daily 1 packet 3     No current facility-administered medications for this visit. Social History:   Social History     Socioeconomic History    Marital status: Single     Spouse name: Not on file    Number of children: Not on file    Years of education: Not on file    Highest education level: Not on file   Occupational History    Not on file   Tobacco Use    Smoking status: Never    Smokeless tobacco: Never   Vaping Use    Vaping Use: Never used   Substance and Sexual Activity    Alcohol use: Yes     Comment: social    Drug use: Never    Sexual activity: Yes     Partners: Male     Birth control/protection: Implant     Comment: nexplanon   Other Topics Concern    Not on file   Social History Narrative    Not on file     Social Determinants of Health     Financial Resource Strain: Not on file   Food Insecurity: Not on file   Transportation Needs: Not on file   Physical Activity: Not on file   Stress: Not on file   Social Connections: Not on file   Intimate Partner Violence: Not on file   Housing Stability: Not on file     TOBACCO:   reports that she has never smoked. She has never used smokeless tobacco.  ETOH:   reports current alcohol use. Family History:   Family History   Problem Relation Age of Onset    High Blood Pressure Mother     High Cholesterol Mother     Asthma Mother     High Cholesterol Father     High Blood Pressure Maternal Grandmother     Other Maternal Grandmother         prediabetes    No Known Problems Paternal Grandmother     No Known Problems Paternal Grandfather     Breast Cancer Maternal Aunt      A:  Patient engaged and cooperative. Denies SI. Insight and motivation are good. Diagnosis:    1. Bipolar 1 disorder ([facility] [address]    2.  Anxiety          Diagnosis Date Anxiety     Depression 3/7/2022    Herpes simplex virus (HSV) infection     cold sores    Mild pre-eclampsia, postpartum 2018    had to go back to the hospital- with second pregancy     Postpartum depression     Psychiatric problem     Sleep apnea     Stress incontinence     Varicella      Plan:  Pt interventions:  Trained in improving communication skills, Conducted functional assessment, [place]-setting to identify pt's primary goals for [facility] visit / overall health, Supportive techniques, Emphasized self-care as important for managing overall health, and Provided Psychoeducation re: mindfulness.     Pt Behavioral Change Plan:   See Pt Instructions

## 2023-01-16 NOTE — PATIENT INSTRUCTIONS
Continue to use grounding to help to stay present or focused. Consider taking notes during conversations to help not interrupt. If you are interested in knowing more about mindfulness, try reading \"Wherever You Go, There You Are\" By Jason Suarez. Review the \"time out\" strategy below. Return to see Dr. Mauricio Wright in 4 weeks. Time-Out Guidelines for Couples    Either partner can call a time-out during a discussion utilizing a pre-agreed upon signal.    Use an I statement to let your partner know that you want to stop the discussion. The other partner agrees to respect the cue that the discussion is escalating. A time-out needs to last long enough for each partner to calm sufficiently. Many couples have discovered that an hour works well. During the time-out, both partners agree to utilize calming techniques. The partner who initiated the time-out should reinitiate the discussion upon returning with both agreeing to use safer more effective ways to communicate. If either partner is not ready to return to the discussion after the time-out, he/she can decline politely. If declining, the partner must agree to a time within 24 hours that he/she will return to the discussion.

## 2023-01-31 DIAGNOSIS — F41.9 ANXIETY AND DEPRESSION: ICD-10-CM

## 2023-01-31 DIAGNOSIS — F32.A ANXIETY AND DEPRESSION: ICD-10-CM

## 2023-01-31 RX ORDER — BUPROPION HYDROCHLORIDE 150 MG/1
TABLET ORAL
Qty: 90 TABLET | Refills: 3 | Status: SHIPPED | OUTPATIENT
Start: 2023-01-31

## 2023-07-06 DIAGNOSIS — F32.A ANXIETY AND DEPRESSION: ICD-10-CM

## 2023-07-06 DIAGNOSIS — F41.9 ANXIETY AND DEPRESSION: ICD-10-CM

## 2023-07-06 RX ORDER — BUPROPION HYDROCHLORIDE 150 MG/1
150 TABLET ORAL EVERY MORNING
Qty: 90 TABLET | Refills: 0 | Status: SHIPPED | OUTPATIENT
Start: 2023-07-06

## 2023-07-06 RX ORDER — CITALOPRAM 40 MG/1
40 TABLET ORAL DAILY
Qty: 90 TABLET | Refills: 0 | Status: SHIPPED | OUTPATIENT
Start: 2023-07-06

## 2023-07-06 NOTE — TELEPHONE ENCOUNTER
[facility] is requesting a rx for     buPROPion (WELLBUTRIN XL) 150 MG extended release tablet    citalopram (CELEXA) 40 MG tablet    Advise

## 2023-09-11 DIAGNOSIS — F32.A ANXIETY AND DEPRESSION: ICD-10-CM

## 2023-09-11 DIAGNOSIS — F41.9 ANXIETY AND DEPRESSION: ICD-10-CM

## 2023-09-11 RX ORDER — BUPROPION HYDROCHLORIDE 150 MG/1
150 TABLET ORAL EVERY MORNING
Qty: 90 TABLET | Refills: 3 | Status: SHIPPED | OUTPATIENT
Start: 2023-09-11
